# Patient Record
Sex: MALE | Race: WHITE | NOT HISPANIC OR LATINO | Employment: OTHER | URBAN - METROPOLITAN AREA
[De-identification: names, ages, dates, MRNs, and addresses within clinical notes are randomized per-mention and may not be internally consistent; named-entity substitution may affect disease eponyms.]

---

## 2018-03-16 PROBLEM — M75.101 TEAR OF RIGHT ROTATOR CUFF: Status: ACTIVE | Noted: 2018-03-16

## 2021-10-27 ENCOUNTER — OFFICE VISIT (OUTPATIENT)
Dept: PAIN MEDICINE | Facility: CLINIC | Age: 81
End: 2021-10-27
Payer: MEDICARE

## 2021-10-27 VITALS
HEART RATE: 60 BPM | BODY MASS INDEX: 24.71 KG/M2 | TEMPERATURE: 97 F | WEIGHT: 176.5 LBS | HEIGHT: 71 IN | SYSTOLIC BLOOD PRESSURE: 114 MMHG | DIASTOLIC BLOOD PRESSURE: 66 MMHG | OXYGEN SATURATION: 100 % | RESPIRATION RATE: 20 BRPM

## 2021-10-27 DIAGNOSIS — M47.816 LUMBAR SPONDYLOSIS: Primary | ICD-10-CM

## 2021-10-27 DIAGNOSIS — M43.16 SPONDYLOLISTHESIS OF LUMBAR REGION: ICD-10-CM

## 2021-10-27 DIAGNOSIS — M54.50 LOW BACK PAIN, UNSPECIFIED BACK PAIN LATERALITY, UNSPECIFIED CHRONICITY, UNSPECIFIED WHETHER SCIATICA PRESENT: ICD-10-CM

## 2021-10-27 DIAGNOSIS — M48.061 SPINAL STENOSIS OF LUMBAR REGION WITHOUT NEUROGENIC CLAUDICATION: ICD-10-CM

## 2021-10-27 DIAGNOSIS — M51.36 DDD (DEGENERATIVE DISC DISEASE), LUMBAR: ICD-10-CM

## 2021-10-27 PROCEDURE — 99214 OFFICE O/P EST MOD 30 MIN: CPT | Mod: PBBFAC,PN | Performed by: ANESTHESIOLOGY

## 2021-10-27 PROCEDURE — 99205 PR OFFICE/OUTPT VISIT, NEW, LEVL V, 60-74 MIN: ICD-10-PCS | Mod: S$PBB,,, | Performed by: ANESTHESIOLOGY

## 2021-10-27 PROCEDURE — 99205 OFFICE O/P NEW HI 60 MIN: CPT | Mod: S$PBB,,, | Performed by: ANESTHESIOLOGY

## 2021-10-27 PROCEDURE — 99999 PR PBB SHADOW E&M-EST. PATIENT-LVL IV: CPT | Mod: PBBFAC,,, | Performed by: ANESTHESIOLOGY

## 2021-10-27 PROCEDURE — 99999 PR PBB SHADOW E&M-EST. PATIENT-LVL IV: ICD-10-PCS | Mod: PBBFAC,,, | Performed by: ANESTHESIOLOGY

## 2021-11-09 ENCOUNTER — TELEPHONE (OUTPATIENT)
Dept: PAIN MEDICINE | Facility: CLINIC | Age: 81
End: 2021-11-09
Payer: MEDICARE

## 2021-11-22 ENCOUNTER — OFFICE VISIT (OUTPATIENT)
Dept: PAIN MEDICINE | Facility: CLINIC | Age: 81
End: 2021-11-22
Payer: MEDICARE

## 2021-11-22 VITALS
OXYGEN SATURATION: 98 % | DIASTOLIC BLOOD PRESSURE: 77 MMHG | HEART RATE: 60 BPM | RESPIRATION RATE: 20 BRPM | BODY MASS INDEX: 24.88 KG/M2 | HEIGHT: 71 IN | TEMPERATURE: 97 F | SYSTOLIC BLOOD PRESSURE: 140 MMHG | WEIGHT: 177.69 LBS

## 2021-11-22 DIAGNOSIS — M54.16 LUMBAR RADICULOPATHY: Primary | ICD-10-CM

## 2021-11-22 DIAGNOSIS — M43.16 SPONDYLOLISTHESIS OF LUMBAR REGION: ICD-10-CM

## 2021-11-22 DIAGNOSIS — M51.36 DDD (DEGENERATIVE DISC DISEASE), LUMBAR: ICD-10-CM

## 2021-11-22 DIAGNOSIS — M47.816 LUMBAR SPONDYLOSIS: ICD-10-CM

## 2021-11-22 DIAGNOSIS — M48.061 SPINAL STENOSIS OF LUMBAR REGION WITHOUT NEUROGENIC CLAUDICATION: ICD-10-CM

## 2021-11-22 PROCEDURE — 99999 PR PBB SHADOW E&M-EST. PATIENT-LVL V: CPT | Mod: PBBFAC,,, | Performed by: PHYSICIAN ASSISTANT

## 2021-11-22 PROCEDURE — 99214 OFFICE O/P EST MOD 30 MIN: CPT | Mod: S$PBB,,, | Performed by: PHYSICIAN ASSISTANT

## 2021-11-22 PROCEDURE — 99214 PR OFFICE/OUTPT VISIT, EST, LEVL IV, 30-39 MIN: ICD-10-PCS | Mod: S$PBB,,, | Performed by: PHYSICIAN ASSISTANT

## 2021-11-22 PROCEDURE — 99215 OFFICE O/P EST HI 40 MIN: CPT | Mod: PBBFAC,PN | Performed by: PHYSICIAN ASSISTANT

## 2021-11-22 PROCEDURE — 99999 PR PBB SHADOW E&M-EST. PATIENT-LVL V: ICD-10-PCS | Mod: PBBFAC,,, | Performed by: PHYSICIAN ASSISTANT

## 2021-11-22 RX ORDER — ALPRAZOLAM 0.5 MG/1
1 TABLET, ORALLY DISINTEGRATING ORAL ONCE AS NEEDED
Status: CANCELLED | OUTPATIENT
Start: 2021-12-08 | End: 2033-05-05

## 2021-12-08 ENCOUNTER — HOSPITAL ENCOUNTER (OUTPATIENT)
Facility: HOSPITAL | Age: 81
Discharge: HOME | End: 2021-12-08
Attending: ANESTHESIOLOGY | Admitting: ANESTHESIOLOGY
Payer: MEDICARE

## 2021-12-08 ENCOUNTER — HOSPITAL ENCOUNTER (OUTPATIENT)
Dept: RADIOLOGY | Facility: HOSPITAL | Age: 81
Discharge: HOME | End: 2021-12-08
Attending: ANESTHESIOLOGY
Payer: MEDICARE

## 2021-12-08 DIAGNOSIS — M54.16 LUMBAR RADICULOPATHY: ICD-10-CM

## 2021-12-08 DIAGNOSIS — M54.50 LOWER BACK PAIN: ICD-10-CM

## 2021-12-08 PROCEDURE — 64483 NJX AA&/STRD TFRM EPI L/S 1: CPT | Mod: 50,PO | Performed by: ANESTHESIOLOGY

## 2021-12-08 PROCEDURE — 64483 NJX AA&/STRD TFRM EPI L/S 1: CPT | Mod: 50,,, | Performed by: ANESTHESIOLOGY

## 2021-12-08 PROCEDURE — 25000003 PHARM REV CODE 250: Mod: PO | Performed by: ANESTHESIOLOGY

## 2021-12-08 PROCEDURE — 64483 PR EPIDURAL INJ, ANES/STEROID, TRANSFORAMINAL, LUMB/SACR, SNGL LEVL: ICD-10-PCS | Mod: 50,,, | Performed by: ANESTHESIOLOGY

## 2021-12-08 PROCEDURE — 76000 FLUOROSCOPY <1 HR PHYS/QHP: CPT | Mod: TC,PO

## 2021-12-08 PROCEDURE — 25500020 PHARM REV CODE 255: Mod: PO | Performed by: ANESTHESIOLOGY

## 2021-12-08 PROCEDURE — 63600175 PHARM REV CODE 636 W HCPCS: Mod: PO | Performed by: ANESTHESIOLOGY

## 2021-12-08 RX ORDER — LIDOCAINE HYDROCHLORIDE 10 MG/ML
INJECTION, SOLUTION EPIDURAL; INFILTRATION; INTRACAUDAL; PERINEURAL
Status: DISCONTINUED | OUTPATIENT
Start: 2021-12-08 | End: 2021-12-08 | Stop reason: HOSPADM

## 2021-12-08 RX ORDER — BUPIVACAINE HYDROCHLORIDE 2.5 MG/ML
INJECTION, SOLUTION EPIDURAL; INFILTRATION; INTRACAUDAL
Status: DISCONTINUED | OUTPATIENT
Start: 2021-12-08 | End: 2021-12-08 | Stop reason: HOSPADM

## 2021-12-08 RX ORDER — METHYLPREDNISOLONE ACETATE 80 MG/ML
INJECTION, SUSPENSION INTRA-ARTICULAR; INTRALESIONAL; INTRAMUSCULAR; SOFT TISSUE
Status: DISCONTINUED | OUTPATIENT
Start: 2021-12-08 | End: 2021-12-08 | Stop reason: HOSPADM

## 2021-12-08 RX ORDER — ALPRAZOLAM 0.5 MG/1
1 TABLET, ORALLY DISINTEGRATING ORAL ONCE AS NEEDED
Status: DISCONTINUED | OUTPATIENT
Start: 2021-12-08 | End: 2021-12-08 | Stop reason: HOSPADM

## 2021-12-09 ENCOUNTER — TELEPHONE (OUTPATIENT)
Dept: PAIN MEDICINE | Facility: CLINIC | Age: 81
End: 2021-12-09
Payer: MEDICARE

## 2021-12-09 VITALS
HEART RATE: 55 BPM | OXYGEN SATURATION: 97 % | RESPIRATION RATE: 16 BRPM | WEIGHT: 175 LBS | HEIGHT: 71 IN | SYSTOLIC BLOOD PRESSURE: 137 MMHG | BODY MASS INDEX: 24.5 KG/M2 | TEMPERATURE: 98 F | DIASTOLIC BLOOD PRESSURE: 77 MMHG

## 2022-01-05 ENCOUNTER — OFFICE VISIT (OUTPATIENT)
Dept: PAIN MEDICINE | Facility: CLINIC | Age: 82
End: 2022-01-05
Payer: MEDICARE

## 2022-01-05 VITALS
DIASTOLIC BLOOD PRESSURE: 67 MMHG | TEMPERATURE: 98 F | BODY MASS INDEX: 25.29 KG/M2 | SYSTOLIC BLOOD PRESSURE: 139 MMHG | OXYGEN SATURATION: 100 % | WEIGHT: 181.31 LBS | HEART RATE: 60 BPM | RESPIRATION RATE: 18 BRPM

## 2022-01-05 DIAGNOSIS — M54.16 LUMBAR RADICULOPATHY: Primary | ICD-10-CM

## 2022-01-05 DIAGNOSIS — M47.816 LUMBAR SPONDYLOSIS: ICD-10-CM

## 2022-01-05 DIAGNOSIS — M48.061 SPINAL STENOSIS OF LUMBAR REGION WITHOUT NEUROGENIC CLAUDICATION: ICD-10-CM

## 2022-01-05 DIAGNOSIS — M43.16 SPONDYLOLISTHESIS OF LUMBAR REGION: ICD-10-CM

## 2022-01-05 DIAGNOSIS — M51.36 DDD (DEGENERATIVE DISC DISEASE), LUMBAR: ICD-10-CM

## 2022-01-05 PROCEDURE — 99213 OFFICE O/P EST LOW 20 MIN: CPT | Mod: PBBFAC,PN | Performed by: PHYSICIAN ASSISTANT

## 2022-01-05 PROCEDURE — 99212 OFFICE O/P EST SF 10 MIN: CPT | Mod: S$PBB,,, | Performed by: PHYSICIAN ASSISTANT

## 2022-01-05 PROCEDURE — 99999 PR PBB SHADOW E&M-EST. PATIENT-LVL III: ICD-10-PCS | Mod: PBBFAC,,, | Performed by: PHYSICIAN ASSISTANT

## 2022-01-05 PROCEDURE — 99999 PR PBB SHADOW E&M-EST. PATIENT-LVL III: CPT | Mod: PBBFAC,,, | Performed by: PHYSICIAN ASSISTANT

## 2022-01-05 PROCEDURE — 99212 PR OFFICE/OUTPT VISIT, EST, LEVL II, 10-19 MIN: ICD-10-PCS | Mod: S$PBB,,, | Performed by: PHYSICIAN ASSISTANT

## 2022-01-05 NOTE — PROGRESS NOTES
This note was completed with dictation software and grammatical errors may exist.    CC:  Back pain    HPI:  The patient is an 81-year-old man with a history of glaucoma, cataracts presents in referral from Dr. Eh Love for back pain.  He is status post bilateral L3/4 transforaminal epidural steroid injections on 12/08/2021 with 85% relief.  He states that his pain is now intermittent and he no longer has leg pain.  He has some mild pain and stiffness across his low back in the mornings with occasional radiation to his buttocks but once he gets going for the day he states that he is fine.  He can play golf and feels that exercise at the gym and activity helps with his pain.  If he sits too long he will also have some pain.  He denies any changes to his bladder incontinence.  He denies bowel incontinence, weakness or numbness.    Prior HPI: The patient reports that he has been active for many years, was of runner even up until his 70s.  He states that 5 years ago he was running and stepped into a hole and felt sudden right hip pain, felt like his hip was shifted to the right.  He states that ever since this time, he has had back pain.  The back pain is located primarily across the bilateral low back and radiates out to the right lateral buttock and hip at times.  He used to have pain radiating along his hamstrings but no longer has this.  He denies any pain radiating into his lower legs, no numbness or weakness.  He states that he feels stiff when he wakes up in the morning but is able to exercise and do activities throughout the day without issue.  However, his pain can come randomly such as when bending over he can feel a sudden sharp pain.  If he has pain, he can do stretches that can often relieve this.  It seems like his pain is mostly worse with extension.  He denies any bowel or bladder incontinence, no weakness.  He does not take any pain medication on a regular basis.  He exercise on a regular basis now  doing stationary bike, weightlifting.    Pain intervention history:   He is status post bilateral L3/4 transforaminal epidural steroid injections on 12/08/2021 with 85% relief.      Spine surgeries:  He has seen Dr. Claude Owusu who suggested that he continue exercising, no surgery needed.    Antineuropathics: Gabapentin 100mg qhs  NSAIDs: Mobic 15  Physical therapy:  He had gone to [facility] physical therapy, also chiropractic care over the last several months.  Antidepressants:  Muscle relaxers:  Opioids:  Antiplatelets/Anticoagulants:    ROS:  He reports back pain, joint stiffness.  Balance of review of systems is negative.    No results found for: LABA1C, HGBA1C    Lab Results   Component Value Date    WBC 8.01 03/16/2018    HGB 15.0 03/16/2018    HCT 43.2 03/16/2018    MCV 93 03/16/2018     03/16/2018             Past Medical History:   Diagnosis Date    Cataract     Detached retina, bilateral     Glaucoma        Past Surgical History:   Procedure Laterality Date    CATARACT EXTRACTION Bilateral     NASAL SEPTUM SURGERY  07/05/2017    SHOULDER ARTHROSCOPY Right 03/16/2018    RCR    TRANSFORAMINAL EPIDURAL INJECTION OF STEROID Bilateral 12/8/2021    Procedure: Injection,steroid,epidural,transforaminal approach L3/4;  Surgeon: Pedro Elizalde MD;  Location: [facility] OR;  Service: Pain Management;  Laterality: Bilateral;    VARICOCELECTOMY         Social History     Socioeconomic History    Marital status:    Tobacco Use    Smoking status: Never Smoker    Smokeless tobacco: Never Used   Substance and Sexual Activity    Alcohol use: Yes     Alcohol/week: 4.0 standard drinks     Types: 4 Glasses of wine per week    Drug use: No         Medications/Allergies: See med card    Vitals:    01/05/22 0844   BP: 139/67   Pulse: 60   Resp: 18   Temp: 97.6 °F (36.4 °C)   TempSrc: Oral   SpO2: 100%   Weight: 82.2 kg (181 lb 5.3 oz)   PainSc:   2   PainLoc: Back         Physical exam:  Gen: A and O x3,  pleasant, well-groomed  Skin: No rashes or obvious lesions  HEENT: PERRLA, no obvious deformities on ears or in canals. Trachea midline.  CVS: Regular rate and rhythm, normal palpable pulses.  Resp:No increased work of breathing, symmetrical chest rise.  Abdomen: Soft, NT/ND.  Musculoskeletal: Able to heel walk, toe walk. No antalgic gait.     Neuro:  Lower extremities: 5/5 strength bilaterally  Reflexes: Patellar 1+, Achilles 0+ bilaterally.  Sensory:  Intact and symmetrical to light touch and pinprick in L2-S1 dermatomes bilaterally.    Lumbar spine:  Lumbar spine: ROM is full with flexion with no increased pain, mildly reduced with extension and oblique extension with increased pain on right oblique extension, no major pain with left oblique extension.  He does have some increased pain moving from fully flexed to neutral position.    Sammy's test causes no increased pain on either side.    Supine straight leg raise is negative bilaterally.    Internal and external rotation of the hip causes no increased pain on either side.  Myofascial exam: No tenderness to palpation across lumbar paraspinous muscles.    Imaging:  MRI lumbar spine 05/10/2019:  This is outside imaging, I reviewed this personally.  It appears that he has an S1/2 rudimentary disc.  At L5/S1 there is slight disc height loss and slight anterolisthesis, mild bilateral foraminal narrowing, no canal narrowing, there is facet arthropathy present.  At L4/5 there is a grade 1 anterolisthesis with uncovering of the disc and facet hypertrophy causing mild to moderate canal stenosis.  There is also severe disc height loss at this level.  At L3/4 there is disc height loss and facet hypertrophy, mild canal stenosis and bilateral foraminal stenosis.    Assessment:  The patient is an 81-year-old man with a history of glaucoma, cataracts presents in referral from Dr. Eh Love for back pain.      1. Lumbar radiculopathy     2. DDD (degenerative disc  disease), lumbar     3. Lumbar spondylosis     4. Spinal stenosis of lumbar region without neurogenic claudication     5. Spondylolisthesis of lumbar region         Plan:  1. The patient had almost complete relief following the bilateral L3/4 transforaminal epidural steroid injections.  This can be repeated in the future if necessary.  2. Follow-up as needed.

## 2022-02-14 ENCOUNTER — TELEPHONE (OUTPATIENT)
Dept: PAIN MEDICINE | Facility: CLINIC | Age: 82
End: 2022-02-14
Payer: MEDICARE

## 2022-02-14 NOTE — TELEPHONE ENCOUNTER
----- Message from Natasha Weller sent at 2/14/2022  8:55 AM CST -----  Type: Patient Call Back         Who called:patient          What is the request in detail:patient called in regarding a few question and concerns          Can the clinic reply by [facility]?no          Would the patient rather a call back or a response via [facility]?call back          Best call back number:[phone] (mobile)          Additional Information:           Thank You

## 2022-02-15 ENCOUNTER — OFFICE VISIT (OUTPATIENT)
Dept: PAIN MEDICINE | Facility: CLINIC | Age: 82
End: 2022-02-15
Payer: MEDICARE

## 2022-02-15 VITALS
WEIGHT: 189.25 LBS | HEIGHT: 71 IN | SYSTOLIC BLOOD PRESSURE: 156 MMHG | HEART RATE: 59 BPM | BODY MASS INDEX: 26.49 KG/M2 | DIASTOLIC BLOOD PRESSURE: 77 MMHG

## 2022-02-15 DIAGNOSIS — M54.16 LUMBAR RADICULOPATHY: Primary | ICD-10-CM

## 2022-02-15 DIAGNOSIS — Z11.9 SCREENING EXAMINATION FOR INFECTIOUS DISEASE: ICD-10-CM

## 2022-02-15 DIAGNOSIS — M51.36 DDD (DEGENERATIVE DISC DISEASE), LUMBAR: ICD-10-CM

## 2022-02-15 DIAGNOSIS — M43.16 SPONDYLOLISTHESIS OF LUMBAR REGION: ICD-10-CM

## 2022-02-15 DIAGNOSIS — M48.061 SPINAL STENOSIS OF LUMBAR REGION WITHOUT NEUROGENIC CLAUDICATION: ICD-10-CM

## 2022-02-15 PROCEDURE — 99999 PR PBB SHADOW E&M-EST. PATIENT-LVL III: CPT | Mod: PBBFAC,,, | Performed by: PHYSICIAN ASSISTANT

## 2022-02-15 PROCEDURE — 99214 OFFICE O/P EST MOD 30 MIN: CPT | Mod: S$PBB,CS,, | Performed by: PHYSICIAN ASSISTANT

## 2022-02-15 PROCEDURE — 99214 PR OFFICE/OUTPT VISIT, EST, LEVL IV, 30-39 MIN: ICD-10-PCS | Mod: S$PBB,CS,, | Performed by: PHYSICIAN ASSISTANT

## 2022-02-15 PROCEDURE — 99213 OFFICE O/P EST LOW 20 MIN: CPT | Mod: PBBFAC,PN | Performed by: PHYSICIAN ASSISTANT

## 2022-02-15 PROCEDURE — 99999 PR PBB SHADOW E&M-EST. PATIENT-LVL III: ICD-10-PCS | Mod: PBBFAC,,, | Performed by: PHYSICIAN ASSISTANT

## 2022-02-15 RX ORDER — TESTOSTERONE CYPIONATE 200 MG/ML
200 INJECTION, SOLUTION INTRAMUSCULAR
COMMUNITY
Start: 2022-02-07

## 2022-02-15 RX ORDER — TAMSULOSIN HYDROCHLORIDE 0.4 MG/1
1 CAPSULE ORAL EVERY MORNING
COMMUNITY
Start: 2022-02-01 | End: 2023-05-19

## 2022-02-15 RX ORDER — ESOMEPRAZOLE MAGNESIUM 40 MG/1
40 CAPSULE, DELAYED RELEASE ORAL
COMMUNITY
Start: 2022-02-08 | End: 2022-11-04

## 2022-02-17 NOTE — PROGRESS NOTES
This note was completed with dictation software and grammatical errors may exist.    CC:  Back pain    HPI:  The patient is an 81-year-old man with a history of glaucoma, cataracts presents in referral from Dr. Eh Love for back pain.  He returns in follow-up today with worsening low back pain.  He states he was doing well until he bent over to  a piece of paper off of the floor and his back pain return.  He also states he was doing shoulders shrugs at the gym 140 pounds which made it worse.  It is located across the right greater than left low back radiating to the lateral buttocks.  He states that pain is constant, worse with trying to stand straight.  He has severe pain when going from a seated to standing position and sometimes his right leg will give out if he has sudden pain.  However he denies any weakness or numbness.  He does report occasional bladder and bowel incontinence.    Prior HPI: The patient reports that he has been active for many years, was of runner even up until his 70s.  He states that 5 years ago he was running and stepped into a hole and felt sudden right hip pain, felt like his hip was shifted to the right.  He states that ever since this time, he has had back pain.  The back pain is located primarily across the bilateral low back and radiates out to the right lateral buttock and hip at times.  He used to have pain radiating along his hamstrings but no longer has this.  He denies any pain radiating into his lower legs, no numbness or weakness.  He states that he feels stiff when he wakes up in the morning but is able to exercise and do activities throughout the day without issue.  However, his pain can come randomly such as when bending over he can feel a sudden sharp pain.  If he has pain, he can do stretches that can often relieve this.  It seems like his pain is mostly worse with extension.  He denies any bowel or bladder incontinence, no weakness.  He does not take any pain  "medication on a regular basis.  He exercise on a regular basis now doing stationary bike, weightlifting.    Pain intervention history:   He is status post bilateral L3/4 transforaminal epidural steroid injections on 12/08/2021 with 85% relief.      Spine surgeries:  He has seen Dr. Claude Owusu who suggested that he continue exercising, no surgery needed.    Antineuropathics: Gabapentin 100mg qhs  NSAIDs: Mobic 15  Physical therapy:  [facility] gone [facility], also chiropractic care over the last several months.  Antidepressants:  Muscle relaxers:  Opioids:  Antiplatelets/Anticoagulants:    ROS:  He reports back pain, joint stiffness.  Balance of review of systems is negative.    No results found for: LABA1C, HGBA1C    Lab Results   Component Value Date    WBC 7.07 01/31/2022    HGB 12.7 (L) 01/31/2022    HCT 37.8 (L) 01/31/2022    MCV 94 01/31/2022     01/31/2022             Past Medical History:   Diagnosis Date    Cataract     Detached retina, bilateral     Glaucoma        Past Surgical History:   Procedure Laterality Date    CATARACT EXTRACTION Bilateral     NASAL SEPTUM SURGERY  07/05/2017    SHOULDER ARTHROSCOPY Right 03/16/2018    RCR    TRANSFORAMINAL EPIDURAL INJECTION OF STEROID Bilateral 12/8/2021    Procedure: Injection,steroid,epidural,transforaminal approach L3/4;  Surgeon: Pedro Elizalde MD;  Location: [facility];  Service: Pain Management;  Laterality: Bilateral;    VARICOCELECTOMY         Social History     Socioeconomic History    Marital status:    Tobacco Use    Smoking status: Never Smoker    Smokeless tobacco: Never Used   Substance and Sexual Activity    Alcohol use: Yes     Alcohol/week: 4.0 standard drinks     Types: 4 Glasses of wine per week    Drug use: No         Medications/Allergies: See med card    Vitals:    02/15/22 1005   BP: (!) 156/77   Pulse: (!) 59   Weight: 85.9 kg (189 lb 4.2 oz)   Height: 5' 11" (1.803 m)   PainSc:   9   PainLoc: Back "         Physical exam:  Gen: A and O x3, pleasant, well-groomed  Skin: No rashes or obvious lesions  HEENT: PERRLA, no obvious deformities on ears or in canals. Trachea midline.  CVS: Regular rate and rhythm, normal palpable pulses.  Resp:No increased work of breathing, symmetrical chest rise.  Abdomen: Soft, NT/ND.  Musculoskeletal:  Slow cautious gait, unable to stand fully upright without severe pain.    Neuro:  Lower extremities: 5/5 strength bilaterally  Reflexes: Patellar 1+, Achilles 0+ bilaterally.  Sensory:  Intact and symmetrical to light touch and pinprick in L2-S1 dermatomes bilaterally.    Lumbar spine:  Lumbar spine: ROM is full with flexion without increased pain.  Range of motion is severely limited with extension with severe increased pain.  Sammy's test causes no increased pain on either side.    Supine straight leg raise is negative bilaterally.    Internal and external rotation of the hip causes no increased pain on either side.  Myofascial exam: No tenderness to palpation across lumbar paraspinous muscles.    Imaging:  MRI lumbar spine 05/10/2019:  This is outside imaging, I reviewed this personally.  It appears that he has an S1/2 rudimentary disc.  At L5/S1 there is slight disc height loss and slight anterolisthesis, mild bilateral foraminal narrowing, no canal narrowing, there is facet arthropathy present.  At L4/5 there is a grade 1 anterolisthesis with uncovering of the disc and facet hypertrophy causing mild to moderate canal stenosis.  There is also severe disc height loss at this level.  At L3/4 there is disc height loss and facet hypertrophy, mild canal stenosis and bilateral foraminal stenosis.    Assessment:  The patient is an 81-year-old man with a history of glaucoma, cataracts presents in referral from Dr. Eh Love for back pain.      1. Lumbar radiculopathy     2. DDD (degenerative disc disease), lumbar     3. Spinal stenosis of lumbar region without neurogenic claudication      4. Spondylolisthesis of lumbar region     5. Screening examination for infectious disease  COVID-19 Routine Screening       Plan:  1. I am going to schedule patient to repeat bilateral L3/4 transforaminal epidural steroid injections.  He has done well with this in the past.  2. We discussed the importance of safe exercises.  3. Follow-up in 4 weeks postprocedure or sooner as needed.

## 2022-02-17 NOTE — H&P (VIEW-ONLY)
This note was completed with dictation software and grammatical errors may exist.    CC:  Back pain    HPI:  The patient is an 81-year-old man with a history of glaucoma, cataracts presents in referral from Dr. Eh Love for back pain.  He returns in follow-up today with worsening low back pain.  He states he was doing well until he bent over to  a piece of paper off of the floor and his back pain return.  He also states he was doing shoulders shrugs at the gym 140 pounds which made it worse.  It is located across the right greater than left low back radiating to the lateral buttocks.  He states that pain is constant, worse with trying to stand straight.  He has severe pain when going from a seated to standing position and sometimes his right leg will give out if he has sudden pain.  However he denies any weakness or numbness.  He does report occasional bladder and bowel incontinence.    Prior HPI: The patient reports that he has been active for many years, was of runner even up until his 70s.  He states that 5 years ago he was running and stepped into a hole and felt sudden right hip pain, felt like his hip was shifted to the right.  He states that ever since this time, he has had back pain.  The back pain is located primarily across the bilateral low back and radiates out to the right lateral buttock and hip at times.  He used to have pain radiating along his hamstrings but no longer has this.  He denies any pain radiating into his lower legs, no numbness or weakness.  He states that he feels stiff when he wakes up in the morning but is able to exercise and do activities throughout the day without issue.  However, his pain can come randomly such as when bending over he can feel a sudden sharp pain.  If he has pain, he can do stretches that can often relieve this.  It seems like his pain is mostly worse with extension.  He denies any bowel or bladder incontinence, no weakness.  He does not take any pain  "medication on a regular basis.  He exercise on a regular basis now doing stationary bike, weightlifting.    Pain intervention history:   He is status post bilateral L3/4 transforaminal epidural steroid injections on 12/08/2021 with 85% relief.      Spine surgeries:  He has seen Dr. Claude Owusu who suggested that he continue exercising, no surgery needed.    Antineuropathics: Gabapentin 100mg qhs  NSAIDs: Mobic 15  Physical therapy:  He had gone to [facility], also chiropractic care over the last several months.  Antidepressants:  Muscle relaxers:  Opioids:  Antiplatelets/Anticoagulants:    ROS:  He reports back pain, joint stiffness.  Balance of review of systems is negative.    No results found for: LABA1C, HGBA1C    Lab Results   Component Value Date    WBC 7.07 01/31/2022    HGB 12.7 (L) 01/31/2022    HCT 37.8 (L) 01/31/2022    MCV 94 01/31/2022     01/31/2022             Past Medical History:   Diagnosis Date    Cataract     Detached retina, bilateral     Glaucoma        Past Surgical History:   Procedure Laterality Date    CATARACT EXTRACTION Bilateral     NASAL SEPTUM SURGERY  07/05/2017    SHOULDER ARTHROSCOPY Right 03/16/2018    RCR    TRANSFORAMINAL EPIDURAL INJECTION OF STEROID Bilateral 12/8/2021    Procedure: Injection,steroid,epidural,transforaminal approach L3/4;  Surgeon: Pedro Elizalde MD;  Location: [facility];  Service: Pain Management;  Laterality: Bilateral;    VARICOCELECTOMY         Social History     Socioeconomic History    Marital status:    Tobacco Use    Smoking status: Never Smoker    Smokeless tobacco: Never Used   Substance and Sexual Activity    Alcohol use: Yes     Alcohol/week: 4.0 standard drinks     Types: 4 Glasses of wine per week    Drug use: No         Medications/Allergies: See med card    Vitals:    02/15/22 1005   BP: (!) 156/77   Pulse: (!) 59   Weight: 85.9 kg (189 lb 4.2 oz)   Height: 5' 11" (1.803 m)   PainSc:   9   PainLoc: Back "         Physical exam:  Gen: A and O x3, pleasant, well-groomed  Skin: No rashes or obvious lesions  HEENT: PERRLA, no obvious deformities on ears or in canals. Trachea midline.  CVS: Regular rate and rhythm, normal palpable pulses.  Resp:No increased work of breathing, symmetrical chest rise.  Abdomen: Soft, NT/ND.  Musculoskeletal:  Slow cautious gait, unable to stand fully upright without severe pain.    Neuro:  Lower extremities: 5/5 strength bilaterally  Reflexes: Patellar 1+, Achilles 0+ bilaterally.  Sensory:  Intact and symmetrical to light touch and pinprick in L2-S1 dermatomes bilaterally.    Lumbar spine:  Lumbar spine: ROM is full with flexion without increased pain.  Range of motion is severely limited with extension with severe increased pain.  Sammy's test causes no increased pain on either side.    Supine straight leg raise is negative bilaterally.    Internal and external rotation of the hip causes no increased pain on either side.  Myofascial exam: No tenderness to palpation across lumbar paraspinous muscles.    Imaging:  MRI lumbar spine 05/10/2019:  This is outside imaging, I reviewed this personally.  It appears that he has an S1/2 rudimentary disc.  At L5/S1 there is slight disc height loss and slight anterolisthesis, mild bilateral foraminal narrowing, no canal narrowing, there is facet arthropathy present.  At L4/5 there is a grade 1 anterolisthesis with uncovering of the disc and facet hypertrophy causing mild to moderate canal stenosis.  There is also severe disc height loss at this level.  At L3/4 there is disc height loss and facet hypertrophy, mild canal stenosis and bilateral foraminal stenosis.    Assessment:  The patient is an 81-year-old man with a history of glaucoma, cataracts presents in referral from Dr. Eh Love for back pain.      1. Lumbar radiculopathy     2. DDD (degenerative disc disease), lumbar     3. Spinal stenosis of lumbar region without neurogenic claudication      4. Spondylolisthesis of lumbar region     5. Screening examination for infectious disease  COVID-19 Routine Screening       Plan:  1. I am going to schedule patient to repeat bilateral L3/4 transforaminal epidural steroid injections.  He has done well with this in the past.  2. We discussed the importance of safe exercises.  3. Follow-up in 4 weeks postprocedure or sooner as needed.

## 2022-02-19 ENCOUNTER — LAB VISIT (OUTPATIENT)
Dept: FAMILY MEDICINE | Facility: CLINIC | Age: 82
End: 2022-02-19
Payer: MEDICARE

## 2022-02-19 DIAGNOSIS — Z11.9 SCREENING EXAMINATION FOR INFECTIOUS DISEASE: ICD-10-CM

## 2022-02-19 PROCEDURE — U0003 INFECTIOUS AGENT DETECTION BY NUCLEIC ACID (DNA OR RNA); SEVERE ACUTE RESPIRATORY SYNDROME CORONAVIRUS 2 (SARS-COV-2) (CORONAVIRUS DISEASE [COVID-19]), AMPLIFIED PROBE TECHNIQUE, MAKING USE OF HIGH THROUGHPUT TECHNOLOGIES AS DESCRIBED BY CMS-2020-01-R: HCPCS | Performed by: PHYSICIAN ASSISTANT

## 2022-02-21 LAB
SARS-COV-2 RNA RESP QL NAA+PROBE: NOT DETECTED
SARS-COV-2- CYCLE NUMBER: NORMAL

## 2022-02-22 DIAGNOSIS — M54.16 LUMBAR RADICULOPATHY: Primary | ICD-10-CM

## 2022-02-22 RX ORDER — ALPRAZOLAM 0.5 MG/1
1 TABLET, ORALLY DISINTEGRATING ORAL ONCE AS NEEDED
Status: CANCELLED | OUTPATIENT
Start: 2022-02-22 | End: 2033-07-21

## 2022-02-24 ENCOUNTER — TELEPHONE (OUTPATIENT)
Dept: PAIN MEDICINE | Facility: CLINIC | Age: 82
End: 2022-02-24
Payer: MEDICARE

## 2022-02-24 NOTE — TELEPHONE ENCOUNTER
----- Message from Marika Juarez sent at 2/24/2022  9:36 AM CST -----  Regarding: Nurse  Contact: Pt  Type: Requesting to speak with nurse    Who Called: PT  Regarding: Injection  Would the patient rather a call back or a response via [facility]? Call back  Best Call Back Number: [phone]  Additional Information:  Pleas call Pt

## 2022-03-02 ENCOUNTER — TELEPHONE (OUTPATIENT)
Dept: PAIN MEDICINE | Facility: CLINIC | Age: 82
End: 2022-03-02
Payer: MEDICARE

## 2022-03-02 DIAGNOSIS — Z11.9 SCREENING EXAMINATION FOR INFECTIOUS DISEASE: ICD-10-CM

## 2022-03-02 NOTE — TELEPHONE ENCOUNTER
----- Message from Maryse Veloz LPN sent at 3/2/2022 10:31 AM CST -----    ----- Message -----  From: Connie Douglas MA  Sent: 2/25/2022   9:23 AM CST  To: Tonio DELGADO Staff    Type:  Patient Returning Call    Who Called:  Juan  Who Left Message for Patient:  Beatrice, but can speak with anyone  Does the patient know what this is regarding?:  scheduling for Epidural  Best Call Back Number:  [phone]  Additional Information:

## 2022-03-03 NOTE — TELEPHONE ENCOUNTER
----- Message from Tremayne Douglas MA sent at 3/3/2022  8:46 AM CST -----  Contact: CLAUDY ARGUETA [[id number]]  Type: Needs Medical Advice    Who Called: CLAUDY ARGUETA [[id number]]  Best Call Back Number: [phone]  Inquiry/Question: Would you kindly call CLAUDY ARGUETA [[id number]] regarding a missed call from the clinic  Thank you~

## 2022-03-15 ENCOUNTER — HOSPITAL ENCOUNTER (OUTPATIENT)
Facility: HOSPITAL | Age: 82
Discharge: HOME | End: 2022-03-15
Attending: ANESTHESIOLOGY | Admitting: ANESTHESIOLOGY
Payer: MEDICARE

## 2022-03-15 ENCOUNTER — HOSPITAL ENCOUNTER (OUTPATIENT)
Dept: RADIOLOGY | Facility: HOSPITAL | Age: 82
Discharge: HOME | End: 2022-03-15
Attending: ANESTHESIOLOGY
Payer: MEDICARE

## 2022-03-15 DIAGNOSIS — M54.16 LUMBAR RADICULOPATHY: ICD-10-CM

## 2022-03-15 DIAGNOSIS — M54.50 LOWER BACK PAIN: ICD-10-CM

## 2022-03-15 PROCEDURE — 63600175 PHARM REV CODE 636 W HCPCS: Mod: PO | Performed by: ANESTHESIOLOGY

## 2022-03-15 PROCEDURE — 25000003 PHARM REV CODE 250: Mod: PO | Performed by: ANESTHESIOLOGY

## 2022-03-15 PROCEDURE — 76000 FLUOROSCOPY <1 HR PHYS/QHP: CPT | Mod: TC,PO

## 2022-03-15 PROCEDURE — 25500020 PHARM REV CODE 255: Mod: PO | Performed by: ANESTHESIOLOGY

## 2022-03-15 PROCEDURE — 64483 NJX AA&/STRD TFRM EPI L/S 1: CPT | Mod: 50,PO | Performed by: ANESTHESIOLOGY

## 2022-03-15 PROCEDURE — 64483 NJX AA&/STRD TFRM EPI L/S 1: CPT | Mod: 50,,, | Performed by: ANESTHESIOLOGY

## 2022-03-15 PROCEDURE — 64483 PR EPIDURAL INJ, ANES/STEROID, TRANSFORAMINAL, LUMB/SACR, SNGL LEVL: ICD-10-PCS | Mod: 50,,, | Performed by: ANESTHESIOLOGY

## 2022-03-15 RX ORDER — LIDOCAINE HYDROCHLORIDE 10 MG/ML
INJECTION, SOLUTION EPIDURAL; INFILTRATION; INTRACAUDAL; PERINEURAL
Status: DISCONTINUED | OUTPATIENT
Start: 2022-03-15 | End: 2022-03-15 | Stop reason: HOSPADM

## 2022-03-15 RX ORDER — ALPRAZOLAM 0.5 MG/1
1 TABLET, ORALLY DISINTEGRATING ORAL ONCE AS NEEDED
Status: COMPLETED | OUTPATIENT
Start: 2022-03-15 | End: 2022-03-15

## 2022-03-15 RX ORDER — BUPIVACAINE HYDROCHLORIDE 2.5 MG/ML
INJECTION, SOLUTION EPIDURAL; INFILTRATION; INTRACAUDAL
Status: DISCONTINUED | OUTPATIENT
Start: 2022-03-15 | End: 2022-03-15 | Stop reason: HOSPADM

## 2022-03-15 RX ORDER — METHYLPREDNISOLONE ACETATE 80 MG/ML
INJECTION, SUSPENSION INTRA-ARTICULAR; INTRALESIONAL; INTRAMUSCULAR; SOFT TISSUE
Status: DISCONTINUED | OUTPATIENT
Start: 2022-03-15 | End: 2022-03-15 | Stop reason: HOSPADM

## 2022-03-15 RX ADMIN — ALPRAZOLAM 0.5 MG: 0.5 TABLET, ORALLY DISINTEGRATING ORAL at 03:03

## 2022-03-15 NOTE — DISCHARGE SUMMARY
[facility] - Surgery  Discharge Note  Short Stay    Procedure(s) (LRB):  Injection,steroid,epidural,transforaminal approach L3/4 (Bilateral)    OUTCOME: Patient tolerated treatment/procedure well without complication and is now ready for discharge.    DISPOSITION: Home or Self Care    FINAL DIAGNOSIS:  Lumbar radiculopathy    FOLLOWUP: In clinic    DISCHARGE INSTRUCTIONS:    Discharge Procedure Orders   Diet Adult Regular     No dressing needed     Notify your health care provider if you experience any of the following:  temperature >100.4     Activity as tolerated

## 2022-03-15 NOTE — OP NOTE
PROCEDURE DATE: 3/15/2022    PROCEDURE: Bilateral L3/4 transforaminal epidural steroid injection under fluoroscopy    DIAGNOSIS: Lumbar  Radiculopathy    Post op diagnosis: Same    PHYSICIAN: Pedro Elizalde MD    MEDICATIONS INJECTED:  Methylprednisolone 40mg (1ml) and 1ml 0.25% bupivicaine at each nerve root.     LOCAL ANESTHETIC INJECTED:  Lidocaine 1%. 4 ml per site.    SEDATION MEDICATIONS: none    ESTIMATED BLOOD LOSS:  none    COMPLICATIONS:  none    TECHNIQUE:   A time-out was taken to identify patient and procedure side prior to starting the procedure. The patient was placed in a prone position, prepped and draped in the usual sterile fashion using ChloraPrep and sterile towels.  The area to be injected was determined under fluoroscopic guidance in AP and oblique view.  Local anesthetic was given by raising a wheal and going down to the hub of a 25-gauge 1.5 inch needle.  In oblique view, a 3.5 inch 22-gauge bent-tip spinal needle was introduced towards 6 oclock position of the pedicle of each above named nerve root level.  The needle was walked medially then hinged into the neural foramen and position was confirmed in AP and lateral views.  Omnipaque contrast dye was injected to confirm appropriate placement and that there was no vascular uptake.  After negative aspiration for blood or CSF, the medication was then injected. This was performed at the right and then left L3/4 level(s). The patient tolerated the procedure well.    The patient was monitored after the procedure.  Patient was given post procedure and discharge instructions to follow at home. The patient was discharged in a stable condition.

## 2022-03-16 VITALS
HEART RATE: 60 BPM | HEIGHT: 72 IN | BODY MASS INDEX: 24.11 KG/M2 | OXYGEN SATURATION: 98 % | WEIGHT: 178 LBS | SYSTOLIC BLOOD PRESSURE: 140 MMHG | DIASTOLIC BLOOD PRESSURE: 78 MMHG | RESPIRATION RATE: 16 BRPM | TEMPERATURE: 98 F

## 2022-03-21 PROBLEM — R20.0 LOSS OF FEELING OR SENSATION: Status: ACTIVE | Noted: 2022-03-21

## 2022-03-21 PROBLEM — G56.03 CARPAL TUNNEL SYNDROME, BILATERAL: Status: ACTIVE | Noted: 2022-03-21

## 2022-03-28 ENCOUNTER — TELEPHONE (OUTPATIENT)
Dept: PAIN MEDICINE | Facility: CLINIC | Age: 82
End: 2022-03-28
Payer: MEDICARE

## 2022-03-28 NOTE — TELEPHONE ENCOUNTER
----- Message from Mona Shepard sent at 3/28/2022 10:22 AM CDT -----  Regarding: Call back  Contact: [phone]  Type:  Patient Call Back    Who Called:pt  What this is regarding?:speak to PA about Dr. Elizalde and does he do a specific procedure  Would the patient rather a call back or a response via [facility]? Call back  Best Call Back Number:[phone]  Additional Information:     Advised to call back directly if there are further questions, or if these symptoms fail to improve as anticipated or worsen.

## 2022-04-12 ENCOUNTER — OFFICE VISIT (OUTPATIENT)
Dept: PAIN MEDICINE | Facility: CLINIC | Age: 82
End: 2022-04-12
Payer: MEDICARE

## 2022-04-12 VITALS
SYSTOLIC BLOOD PRESSURE: 155 MMHG | RESPIRATION RATE: 16 BRPM | DIASTOLIC BLOOD PRESSURE: 82 MMHG | HEART RATE: 55 BPM | HEIGHT: 72 IN | WEIGHT: 188.81 LBS | BODY MASS INDEX: 25.57 KG/M2

## 2022-04-12 DIAGNOSIS — M43.16 SPONDYLOLISTHESIS OF LUMBAR REGION: ICD-10-CM

## 2022-04-12 DIAGNOSIS — M54.16 LUMBAR RADICULOPATHY: ICD-10-CM

## 2022-04-12 DIAGNOSIS — M48.061 SPINAL STENOSIS OF LUMBAR REGION WITHOUT NEUROGENIC CLAUDICATION: ICD-10-CM

## 2022-04-12 DIAGNOSIS — M51.36 DDD (DEGENERATIVE DISC DISEASE), LUMBAR: ICD-10-CM

## 2022-04-12 DIAGNOSIS — M48.02 CERVICAL SPINAL STENOSIS: Primary | ICD-10-CM

## 2022-04-12 PROCEDURE — 99999 PR PBB SHADOW E&M-EST. PATIENT-LVL III: CPT | Mod: PBBFAC,,, | Performed by: PHYSICIAN ASSISTANT

## 2022-04-12 PROCEDURE — 99999 PR PBB SHADOW E&M-EST. PATIENT-LVL III: ICD-10-PCS | Mod: PBBFAC,,, | Performed by: PHYSICIAN ASSISTANT

## 2022-04-12 PROCEDURE — 99213 PR OFFICE/OUTPT VISIT, EST, LEVL III, 20-29 MIN: ICD-10-PCS | Mod: S$PBB,,, | Performed by: PHYSICIAN ASSISTANT

## 2022-04-12 PROCEDURE — 99213 OFFICE O/P EST LOW 20 MIN: CPT | Mod: S$PBB,,, | Performed by: PHYSICIAN ASSISTANT

## 2022-04-12 PROCEDURE — 99213 OFFICE O/P EST LOW 20 MIN: CPT | Mod: PBBFAC,PN | Performed by: PHYSICIAN ASSISTANT

## 2022-04-18 NOTE — PROGRESS NOTES
This note was completed with dictation software and grammatical errors may exist.    CC:  Back pain    HPI:  The patient is an 81-year-old man with a history of glaucoma, cataracts presents in referral from Dr. Eh Love for back pain.  He is status post bilateral L3/4 transforaminal epidural steroid injections on 03/15/2022 with at least 50% relief.  He continues to have some low back pain that is worse in the mornings but intermittent.  He has pain in his buttocks and beneath his buttocks every now and then.  He reports a numb and cold sensation in his forearms and hands bilaterally.  He denies any major neck pain.  He does have a history of left ulnar nerve release and left carpal tunnel surgery without relief.    Prior HPI: The patient reports that he has been active for many years, was of runner even up until his 70s.  He states that 5 years ago he was running and stepped into a hole and felt sudden right hip pain, felt like his hip was shifted to the right.  He states that ever since this time, he has had back pain.  The back pain is located primarily across the bilateral low back and radiates out to the right lateral buttock and hip at times.  He used to have pain radiating along his hamstrings but no longer has this.  He denies any pain radiating into his lower legs, no numbness or weakness.  He states that he feels stiff when he wakes up in the morning but is able to exercise and do activities throughout the day without issue.  However, his pain can come randomly such as when bending over he can feel a sudden sharp pain.  If he has pain, he can do stretches that can often relieve this.  It seems like his pain is mostly worse with extension.  He denies any bowel or bladder incontinence, no weakness.  He does not take any pain medication on a regular basis.  He exercise on a regular basis now doing stationary bike, weightlifting.    Pain intervention history:   He is status post bilateral L3/4  transforaminal epidural steroid injections on 12/08/2021 with 85% relief.    He is status post bilateral L3/4 transforaminal epidural steroid injections on 03/15/2022 with at least 50% relief.    Spine surgeries:  He has seen Dr. Claude Owusu who suggested that he continue exercising, no surgery needed.    Antineuropathics: Gabapentin 100mg qhs  NSAIDs: Mobic 15  Physical therapy:  [facility], also chiropractic care over the last several months.  Antidepressants:  Muscle relaxers:  Opioids:  Antiplatelets/Anticoagulants:    ROS:  He reports back pain, joint stiffness.  Balance of review of systems is negative.    No results found for: LABA1C, HGBA1C    Lab Results   Component Value Date    WBC 8.97 03/25/2022    HGB 14.7 03/25/2022    HCT 44.2 03/25/2022    MCV 95 03/25/2022     03/25/2022             Past Medical History:   Diagnosis Date    Cataract     Detached retina, bilateral     Glaucoma     Restless leg syndrome        Past Surgical History:   Procedure Laterality Date    CATARACT EXTRACTION Bilateral     NASAL SEPTUM SURGERY  07/05/2017    SHOULDER ARTHROSCOPY Right 03/16/2018    RCR    TRANSFORAMINAL EPIDURAL INJECTION OF STEROID Bilateral 12/8/2021    Procedure: Injection,steroid,epidural,transforaminal approach L3/4;  Surgeon: Pedro Elizalde MD;  Location: [facility] OR;  Service: Pain Management;  Laterality: Bilateral;    TRANSFORAMINAL EPIDURAL INJECTION OF STEROID Bilateral 3/15/2022    Procedure: Injection,steroid,epidural,transforaminal approach L3/4;  Surgeon: Pedro Elizalde MD;  Location: [facility] OR;  Service: Pain Management;  Laterality: Bilateral;    VARICOCELECTOMY         Social History     Socioeconomic History    Marital status:    Tobacco Use    Smoking status: Never Smoker    Smokeless tobacco: Never Used   Substance and Sexual Activity    Alcohol use: Yes     Alcohol/week: 4.0 standard drinks     Types: 4 Glasses of wine per week     Drug use: No         Medications/Allergies: See med card    Vitals:    04/12/22 1131   BP: (!) 155/82   Pulse: (!) 55   Resp: 16   Weight: 85.6 kg (188 lb 13.2 oz)   Height: 6' (1.829 m)   PainSc:   3   PainLoc: Back         Physical exam:  Gen: A and O x3, pleasant, well-groomed  Skin: No rashes or obvious lesions  HEENT: PERRLA, no obvious deformities on ears or in canals.Trachea midline.  CVS: Regular rate and rhythm, normal palpable pulses.  Resp: Clear to auscultation bilaterally, no wheezes or rales.  Abdomen: Soft, NT/ND.  Musculoskeletal: Able to heel walk, toe walk. No antalgic gait.   Coordination   Romberg: negative  Finger to nose coordination: normal  Heel to shin coordination: somewhat off balance but able to perform  Tandem walking coordination: normal    Neuro:  Motor:    Right Left   C4 Shoulder Abduction  5  5   C5 Elbow Flexion    5  5   C6 Wrist Extension  5  5   C7 Elbow Extension   5  5   C8/T1 Hand Intrinsics   5  5   C8 First Dorsal Interosseus  5  5   C8 Abductor Pollicus Brevis  5  5       Iliopsoas Quadriceps Knee  Flexion Tibialis  anterior Gastro- cnemius EHL   Lower: R 5/5 5/5 5/5 5/5 5/5 5/5    L 5/5 5/5 5/5 5/5 5/5 5/5        Left  Right    Triceps DTR 2+ 1+   Biceps DTR 2+ 1+   Brachioradialis DTR 2+ 1+   Patellar DTR 1+ 1+   Achilles DTR 0+ 0+   Hays Absent  Absent   Clonus Absent Absent   Babinski Absent Absent     Sensory: Intact and symmetrical to light touch and pinprick in C2-T1 dermatomes bilaterally. Intact and symmetrical to light touch and pinprick in L1-S1 dermatomes bilaterally.    Cervical spine: ROM is full in flexion, extension and lateral rotation without increased pain.  Spurling's maneuver causes no neck pain to either side.  Myofascial exam: No Tenderness to palpation across cervical paraspinous region bilaterally.    Lumbar spine:  Lumbar spine: ROM is full with flexion extension and oblique extension with no increased pain.    Sammy's test causes no  increased pain on either side.    Supine straight leg raise is negative bilaterally.    Internal and external rotation of the hip causes no increased pain on either side.  Myofascial exam: No tenderness to palpation across lumbar paraspinous muscles.      Imaging:  MRI lumbar spine 05/10/2019:  This is outside imaging, I reviewed this personally.  It appears that he has an S1/2 rudimentary disc.  At L5/S1 there is slight disc height loss and slight anterolisthesis, mild bilateral foraminal narrowing, no canal narrowing, there is facet arthropathy present.  At L4/5 there is a grade 1 anterolisthesis with uncovering of the disc and facet hypertrophy causing mild to moderate canal stenosis.  There is also severe disc height loss at this level.  At L3/4 there is disc height loss and facet hypertrophy, mild canal stenosis and bilateral foraminal stenosis.    03/24/2022 MRI cervical spine paradigm report  There is reversal the normal cervical lordosis with curvature to the left.  There is disc desiccation throughout the spine with disc space narrowing and anterior osteophytic spurring from C2-3 through C7-T1.  There are endplate Modic changes from C2-3 through C6-7.  Partial interbody fusion suggested at C4-5.  C2-3 central canal 8.3 millimeters, facet hypertrophic changes, moderate to severe right and severe left foraminal stenosis.  C3-4 there is a 3.2 millimeter disc protrusion with uncovertebral spurring with she contacts and displaces the cord, central canal 3.9 millimeters, facet hypertrophic changes, severe right and moderate to severe left foraminal stenosis.  C4-5 1 millimeter of uncovertebral spurring which contacts the cord, central canal measures 7.4 millimeters, facet hypertrophic changes, moderate to severe right and moderate left foraminal stenosis.  C5-6 there is a 3.1 millimeter disc protrusion with uncovertebral spurring which effaces the cord, central canal 7.3 millimeters, facet hypertrophic changes,  severe bilateral foraminal stenosis.  C6-7 there is a 3.4 millimeter disc protrusion with uncovertebral spurring which contacts the cord, central canal 7.1 millimeters, facet hypertrophic changes, severe bilateral foraminal stenosis.  C7-T1 there is a 1.1 millimeter disc bulge which effaces the dural sac, facet hypertrophic changes, severe bilateral foraminal stenosis.    Assessment:  The patient is an 81-year-old man with a history of glaucoma, cataracts presents in referral from Dr. Eh Love for back pain.      1. Cervical spinal stenosis  Ambulatory referral/consult to Neurosurgery   2. DDD (degenerative disc disease), lumbar     3. Lumbar radiculopathy     4. Spinal stenosis of lumbar region without neurogenic claudication     5. Spondylolisthesis of lumbar region         Plan:  1. The patient did well following bilat her health 4 transforaminal epidural steroid injections.  This can be repeated in the future if necessary.  2. He brought his cervical spine MRI report which revealed severe stenosis at C3-4 and multilevel foraminal stenosis.  He is not having neck pain but reports constant numbness and cold in his forearms and hands.  Prior to considering an epidural steroid injection I am going to have him see Neurosurgery for further evaluation.  I advised him to obtain a disc so that he can bring this to his Neurosurgery appointment.

## 2022-04-19 PROBLEM — R20.0 LOSS OF FEELING OR SENSATION: Status: RESOLVED | Noted: 2022-03-21 | Resolved: 2022-04-19

## 2022-04-19 PROBLEM — G56.03 CARPAL TUNNEL SYNDROME, BILATERAL: Status: RESOLVED | Noted: 2022-03-21 | Resolved: 2022-04-19

## 2022-04-20 ENCOUNTER — OFFICE VISIT (OUTPATIENT)
Dept: NEUROSURGERY | Facility: CLINIC | Age: 82
End: 2022-04-20
Payer: MEDICARE

## 2022-04-20 VITALS
WEIGHT: 188.69 LBS | BODY MASS INDEX: 25.56 KG/M2 | SYSTOLIC BLOOD PRESSURE: 138 MMHG | RESPIRATION RATE: 18 BRPM | HEIGHT: 72 IN | HEART RATE: 62 BPM | DIASTOLIC BLOOD PRESSURE: 85 MMHG

## 2022-04-20 DIAGNOSIS — M48.02 STENOSIS OF CERVICAL SPINE WITH MYELOPATHY: Primary | ICD-10-CM

## 2022-04-20 DIAGNOSIS — M54.2 CERVICALGIA: ICD-10-CM

## 2022-04-20 DIAGNOSIS — M48.02 CERVICAL SPINAL STENOSIS: ICD-10-CM

## 2022-04-20 DIAGNOSIS — G99.2 STENOSIS OF CERVICAL SPINE WITH MYELOPATHY: Primary | ICD-10-CM

## 2022-04-20 PROCEDURE — 99205 OFFICE O/P NEW HI 60 MIN: CPT | Mod: S$PBB,,, | Performed by: STUDENT IN AN ORGANIZED HEALTH CARE EDUCATION/TRAINING PROGRAM

## 2022-04-20 PROCEDURE — 99205 PR OFFICE/OUTPT VISIT, NEW, LEVL V, 60-74 MIN: ICD-10-PCS | Mod: S$PBB,,, | Performed by: STUDENT IN AN ORGANIZED HEALTH CARE EDUCATION/TRAINING PROGRAM

## 2022-04-20 NOTE — PROGRESS NOTES
[facility]  Clinic Consult     Consult Requested By: Eh Love MD, Chris Salmon *        SUBJECTIVE:     Chief Complaint:   Chief Complaint   Patient presents with    Cervical Spine Pain (C-spine)     Cervical spine stenosis. Severe numbness in both hands with occasional tingling. Denies pain in neck.       History of Present Illness:  Juan Terrazas is a 81 y.o. male who presents with evaluation of cervical stenosis with myelopathy  He presents with upper extremity numbness over the past year.  He has intermittent pain from the neck down the right arm to the fingers.  He has noted a progression of poor dexterity in both hands difficulty with fine motor tasks struggles with buttoning buttons.  Feels like this got worse  However stable over the last 6 months  High he feels that when he drives his arms get weaker and hands      He has concerns of balance falls progression of his symptoms was referred a cervical MRI with severe stenosis    He is very functional still works out at the gym, lifts weights  Enjoys playing golf          Review of patient's allergies indicates:   Allergen Reactions    Tetanus antitoxin      This was as a child, uses the newer tetanus vaccine.       Past Medical History:   Diagnosis Date    Cataract     Detached retina, bilateral     Glaucoma     Restless leg syndrome      Past Surgical History:   Procedure Laterality Date    CATARACT EXTRACTION Bilateral     NASAL SEPTUM SURGERY  07/05/2017    SHOULDER ARTHROSCOPY Right 03/16/2018    RCR    TRANSFORAMINAL EPIDURAL INJECTION OF STEROID Bilateral 12/8/2021    Procedure: Injection,steroid,epidural,transforaminal approach L3/4;  Surgeon: Pedro Elizalde MD;  Location: [facility] OR;  Service: Pain Management;  Laterality: Bilateral;    TRANSFORAMINAL EPIDURAL INJECTION OF STEROID Bilateral 3/15/2022    Procedure: Injection,steroid,epidural,transforaminal approach L3/4;  Surgeon:  Pedro Elizalde MD;  Location: [facility] OR;  Service: Pain Management;  Laterality: Bilateral;    VARICOCELECTOMY       Family History   Problem Relation Age of Onset    Heart disease Father      Social History     Tobacco Use    Smoking status: Never Smoker    Smokeless tobacco: Never Used   Substance Use Topics    Alcohol use: Yes     Alcohol/week: 4.0 standard drinks     Types: 4 Glasses of wine per week    Drug use: No            OBJECTIVE:     Vital Signs (Most Recent):  Pulse: 62 (04/20/22 1338)  Resp: 18 (04/20/22 1338)  BP: 138/85 (04/20/22 1338)    Physical Exam:      General: well developed, well nourished, no distress. .  Mental Status: Awake, Alert, Oriented x 4  Language: No aphasia  Speech: No dysarthria  Head: normocephalic, atraumatic.  Neck: trachea midline, no JVD   Cardiovascular: no LE edema  Pulmonary: normal respirations, no signs of respiratory distress  Abdomen: soft, non-distended  Skin: Skin is warm, dry and intact.    Motor Strength:  No abnormal movements seen.       Difficulty raising over heads  Strength  Deltoids Triceps Biceps Wrist Extension Wrist Flexion Hand  Interossei     Upper: R 4-/5 5/5 5/5 5/5 5/5 5/5 5/5      L 4-/5 5/5 5/5 5/5 5/5 5/5 5/5       Iliopsoas Quadriceps Knee  Flexion Tibialis  anterior Gastro- cnemius EHL  Foot Eversion Foot inversion   Lower: R 5/5 5/5 5/5 5/5 5/5 5/5 5/5 5/5 5/5    L 5/5 5/5 5/5 5/5 5/5 5/5 5/5 5/5 5/5   Dec lt/pp below elbows      DTR's: 2 + and  in UE and LE bicep 3+  Hays: absent +right  Clonus: absent  Babinski: absent    Gait: independent           Diagnostic Results:  I have independently reviewed the following imaging:  Mri    Multilevel degeneration  Severe stenosis C3-4 circumferential with ligament buckling  C4-5 severe DDD appears autofusion no cord compression    C5-6 and C6-7 DDD disc osteophyte with loss of functional reserve , cord compression          ASSESSMENT/PLAN:     Stenosis of cervical spine with  myelopathy    Cervical spinal stenosis  -     Ambulatory referral/consult to Neurosurgery  -     X-Ray Cervical Spine AP Lat with Flexion  Extension; Future; Expected date: 04/20/2022    Cervicalgia  -     CT Cervical Spine Without Contrast; Future; Expected date: 04/20/2022      Hx of back pain , managed conservatively  1 yr of s/s of cervical myelopathy with loss of sensation in UE, right radiating pain intermittent  Concern with decreased dexterity progressively; however stable over last 6 mo  We had a long discussion of options surveillance, fall precaution, PT vs surgery  He has cord compression with severe stenosis 3-4 and stenosis C5-6 6-7, 4-5 appears autofused  Prominent ligament at C3-4  Discussed risk/benefit of anterior vs posterior surgery  Will require multilevel fusion, if C4-5 is autofused possible 3-4 standalone 5-7 ACDF , vs pcf  Will get xrays and CT for planning    He is very functional   Did have episode of hypotension , mild bradycardia in ED 3/22; no recurrent episodes  He has Cardiology appt first week of may  Will have that evaluation and assessment for surgical clearance        The diagnosis, goals, limitations, risks and benefits of surgery and alternative treatment options where discussed at length (pros/cons). All questions/concerns were addressed. The patient has verbalized a good understanding of the diagnosis, the potential  procedure, anticipated post-operative course, overall expectations, and risks including but not limited to:  Dysphagia, dysphonia, Cervical palsy, nerve root injury/paralysis, death, nerve injury leading to pain or neurological deficit, csf leak, vascular injury or serious bleeding/need for blood product transfusion/stroke, wrong level surgery, hardware/instrumenteation misplacement, chronic pain/failure to improve or worsening of symptoms, infection, pseudoarthrosis, hardware failure, need for further surgery at the same or different levels; medical (eg Heart  attack, blood clot, infection) and anesthetic complications.          Abram Ellison MD  Neurosurgery

## 2022-05-04 ENCOUNTER — HOSPITAL ENCOUNTER (OUTPATIENT)
Dept: RADIOLOGY | Facility: HOSPITAL | Age: 82
Discharge: HOME | End: 2022-05-04
Attending: STUDENT IN AN ORGANIZED HEALTH CARE EDUCATION/TRAINING PROGRAM
Payer: MEDICARE

## 2022-05-04 DIAGNOSIS — M48.02 CERVICAL SPINAL STENOSIS: ICD-10-CM

## 2022-05-04 DIAGNOSIS — M54.2 CERVICALGIA: ICD-10-CM

## 2022-05-04 PROCEDURE — 72125 CT NECK SPINE W/O DYE: CPT | Mod: TC,PO

## 2022-05-04 PROCEDURE — 72050 X-RAY EXAM NECK SPINE 4/5VWS: CPT | Mod: TC,FY,PO

## 2022-05-04 PROCEDURE — 72050 XR CERVICAL SPINE AP LAT WITH FLEX EXTEN: ICD-10-PCS | Mod: 26,,, | Performed by: RADIOLOGY

## 2022-05-04 PROCEDURE — 72050 X-RAY EXAM NECK SPINE 4/5VWS: CPT | Mod: 26,,, | Performed by: RADIOLOGY

## 2022-05-04 PROCEDURE — 72125 CT NECK SPINE W/O DYE: CPT | Mod: 26,,, | Performed by: RADIOLOGY

## 2022-05-04 PROCEDURE — 72125 CT CERVICAL SPINE WITHOUT CONTRAST: ICD-10-PCS | Mod: 26,,, | Performed by: RADIOLOGY

## 2022-05-06 ENCOUNTER — OFFICE VISIT (OUTPATIENT)
Dept: NEUROSURGERY | Facility: CLINIC | Age: 82
End: 2022-05-06
Payer: MEDICARE

## 2022-05-06 VITALS
BODY MASS INDEX: 25.74 KG/M2 | WEIGHT: 190.06 LBS | SYSTOLIC BLOOD PRESSURE: 125 MMHG | HEIGHT: 72 IN | RESPIRATION RATE: 18 BRPM | HEART RATE: 64 BPM | DIASTOLIC BLOOD PRESSURE: 83 MMHG

## 2022-05-06 DIAGNOSIS — G99.2 STENOSIS OF CERVICAL SPINE WITH MYELOPATHY: ICD-10-CM

## 2022-05-06 DIAGNOSIS — R79.1 ABNORMAL COAGULATION PROFILE: ICD-10-CM

## 2022-05-06 DIAGNOSIS — M50.30 DDD (DEGENERATIVE DISC DISEASE), CERVICAL: Primary | ICD-10-CM

## 2022-05-06 DIAGNOSIS — M48.02 CERVICAL SPINAL STENOSIS: Primary | ICD-10-CM

## 2022-05-06 DIAGNOSIS — M48.02 STENOSIS OF CERVICAL SPINE WITH MYELOPATHY: ICD-10-CM

## 2022-05-06 PROCEDURE — 99214 PR OFFICE/OUTPT VISIT, EST, LEVL IV, 30-39 MIN: ICD-10-PCS | Mod: S$PBB,,, | Performed by: STUDENT IN AN ORGANIZED HEALTH CARE EDUCATION/TRAINING PROGRAM

## 2022-05-06 PROCEDURE — 99214 OFFICE O/P EST MOD 30 MIN: CPT | Mod: S$PBB,,, | Performed by: STUDENT IN AN ORGANIZED HEALTH CARE EDUCATION/TRAINING PROGRAM

## 2022-05-06 RX ORDER — SODIUM CHLORIDE, SODIUM LACTATE, POTASSIUM CHLORIDE, CALCIUM CHLORIDE 600; 310; 30; 20 MG/100ML; MG/100ML; MG/100ML; MG/100ML
INJECTION, SOLUTION INTRAVENOUS CONTINUOUS
Status: CANCELLED | OUTPATIENT
Start: 2022-05-06

## 2022-05-06 RX ORDER — LIDOCAINE HYDROCHLORIDE 10 MG/ML
1 INJECTION, SOLUTION EPIDURAL; INFILTRATION; INTRACAUDAL; PERINEURAL ONCE
Status: CANCELLED | OUTPATIENT
Start: 2022-05-06 | End: 2022-05-06

## 2022-05-06 NOTE — PROGRESS NOTES
[facility]  Clinic Consult     Consult Requested By: Eh Love MD, No ref. provider found        SUBJECTIVE:     Chief Complaint:   Chief Complaint   Patient presents with    discuss surgery     Discuss surgery. Numbness, tingling, both arms, hands. Patient denies pain in neck, arms.   reviewed complete imaging, complexity of multilevel stenosis and options  Cleared by Cardiology Dr. Trujillo    CT, Xrays and MRI reviewed    He is concenred about dexterity and progressive sensory loss   hand fnx worse; was playing [employer]  But cant now    Very active, lifts weights   In great health for his age      History of Present Illness:      SCAR Terrazas is a 81 y.o. male who presents with evaluation of cervical stenosis with myelopathy  He presents with upper extremity numbness over the past year.  He has intermittent pain from the neck down the right arm to the fingers.  He has noted a progression of poor dexterity in both hands difficulty with fine motor tasks struggles with buttoning buttons.  Feels like this got worse  However stable over the last 6 months  High he feels that when he drives his arms get weaker and hands      He has concerns of balance falls progression of his symptoms was referred a cervical MRI with severe stenosis    He is very functional still works out at the gym, lifts weights  Enjoys playing golf          Review of patient's allergies indicates:   Allergen Reactions    Tetanus antitoxin      This was as a child, uses the newer tetanus vaccine.       Past Medical History:   Diagnosis Date    Cataract     Detached retina, bilateral     Glaucoma     Restless leg syndrome      Past Surgical History:   Procedure Laterality Date    CATARACT EXTRACTION Bilateral     NASAL SEPTUM SURGERY  07/05/2017    SHOULDER ARTHROSCOPY Right 03/16/2018    RCR    TRANSFORAMINAL EPIDURAL INJECTION OF STEROID Bilateral 12/8/2021    Procedure:  Injection,steroid,epidural,transforaminal approach L3/4;  Surgeon: Pedro Elizalde MD;  Location: [facility] OR;  Service: Pain Management;  Laterality: Bilateral;    TRANSFORAMINAL EPIDURAL INJECTION OF STEROID Bilateral 3/15/2022    Procedure: Injection,steroid,epidural,transforaminal approach L3/4;  Surgeon: Pedro Elizalde MD;  Location: [facility] OR;  Service: Pain Management;  Laterality: Bilateral;    VARICOCELECTOMY       Family History   Problem Relation Age of Onset    Heart disease Father      Social History     Tobacco Use    Smoking status: Never Smoker    Smokeless tobacco: Never Used   Substance Use Topics    Alcohol use: Yes     Alcohol/week: 4.0 standard drinks     Types: 4 Glasses of wine per week    Drug use: No            OBJECTIVE:     Vital Signs (Most Recent):  Pulse: 64 (05/06/22 1116)  Resp: 18 (05/06/22 1116)  BP: 125/83 (05/06/22 1116)    Physical Exam:      General: well developed, well nourished, no distress. .  Mental Status: Awake, Alert, Oriented x 4  Language: No aphasia  Speech: No dysarthria  Head: normocephalic, atraumatic.  Neck: trachea midline, no JVD   Cardiovascular: no LE edema  Pulmonary: normal respirations, no signs of respiratory distress  Abdomen: soft, non-distended  Skin: Skin is warm, dry and intact.    Motor Strength:  No abnormal movements seen.       Difficulty raising over heads  Strength  Deltoids Triceps Biceps Wrist Extension Wrist Flexion Hand  Interossei     Upper: R 4-/5 5/5 5/5 5/5 5/5 5/5 5/5      L 4-/5 5/5 5/5 5/5 5/5 5/5 5/5       Iliopsoas Quadriceps Knee  Flexion Tibialis  anterior Gastro- cnemius EHL  Foot Eversion Foot inversion   Lower: R 5/5 5/5 5/5 5/5 5/5 5/5 5/5 5/5 5/5    L 5/5 5/5 5/5 5/5 5/5 5/5 5/5 5/5 5/5   Dec lt/pp below elbows      DTR's: 2 + and  in UE and LE bicep 3+  Hays: absent +right  Clonus: absent  Babinski: absent    Gait: independent           Diagnostic Results:  I have independently reviewed the following  imaging:  Mri    Multilevel degeneration  Severe stenosis C3-4 circumferential with ligament buckling  C4-5 severe DDD appears autofusion no cord compression    C5-6 and C6-7 DDD disc osteophyte with loss of functional reserve , cord compression          ASSESSMENT/PLAN:     DDD (degenerative disc disease), cervical    Stenosis of cervical spine with myelopathy      Hx of back pain , managed conservatively  1 yr of s/s of cervical myelopathy with loss of sensation in UE, right radiating pain intermittent  Concern with decreased dexterity progressively; however stable over last 6 mo  We had a long discussion of options surveillance, fall precaution, PT vs surgery  He has cord compression with severe stenosis 3-4 and stenosis C5-6 6-7, 4-5 appears autofused  Prominent ligament at C3-4  Discussed risk/benefit of anterior vs posterior surgery  Will require multilevel fusion, if C4-5 is autofused possible 3-4 standalone 5-7 ACDF , vs pcf  Will get xrays and CT for planning    He is very functional   Did have episode of hypotension , mild bradycardia in ED 3/22; no recurrent episodes  He has Cardiology appt first week of may  Will have that evaluation and assessment for surgical clearance      A/P today  Following complete image review and pt goals  Cervical stenosis with myelopathy progression  He has severe stenosis with prominent dorsal compression at C3-4  Suspicious for csc at 3-4 and 5-6  Severe degeneration with stenosis at C5-6 and 6-7  autofusion 2-3 and 4-5  He will require mulitlevel decompression and fusion  Risk, benefit goals reviewed , including non op treatment and surgery options  He will proceed with C2/3- 7 decompression , 3-1, possible C2-T2 instrumented fusion  All indicated  Has been cleared by cards and wants to proceed asap        The diagnosis, goals, limitations, risks and benefits of surgery and alternative treatment options where discussed at length (pros/cons). All questions/concerns were  addressed. The patient has verbalized a good understanding of the diagnosis, the potential  procedure, anticipated post-operative course, overall expectations, and risks including but not limited to:  Dysphagia, dysphonia, Cervical palsy, nerve root injury/paralysis, death, nerve injury leading to pain or neurological deficit, csf leak, vascular injury or serious bleeding/need for blood product transfusion/stroke, wrong level surgery, hardware/instrumenteation misplacement, chronic pain/failure to improve or worsening of symptoms, infection, pseudoarthrosis, hardware failure, need for further surgery at the same or different levels; medical (eg Heart attack, blood clot, infection) and anesthetic complications.          Abram Ellison MD  Neurosurgery

## 2022-05-09 DIAGNOSIS — M48.02 CERVICAL SPINAL STENOSIS: Primary | ICD-10-CM

## 2022-05-09 DIAGNOSIS — Z98.1 S/P CERVICAL SPINAL FUSION: ICD-10-CM

## 2022-05-18 PROBLEM — K21.9 GASTROESOPHAGEAL REFLUX DISEASE WITHOUT ESOPHAGITIS: Status: ACTIVE | Noted: 2022-05-18

## 2022-05-18 PROBLEM — M48.02 CERVICAL SPINAL STENOSIS: Status: ACTIVE | Noted: 2022-05-18

## 2022-05-18 PROBLEM — N40.0 BPH (BENIGN PROSTATIC HYPERPLASIA): Status: ACTIVE | Noted: 2022-05-18

## 2022-05-19 PROBLEM — E83.51 HYPOCALCEMIA: Status: ACTIVE | Noted: 2022-05-19

## 2022-05-19 PROBLEM — D64.9 ANEMIA: Status: ACTIVE | Noted: 2022-05-19

## 2022-05-19 PROBLEM — E87.1 HYPONATREMIA: Status: ACTIVE | Noted: 2022-05-19

## 2022-06-02 RX ORDER — OXYCODONE AND ACETAMINOPHEN 7.5; 325 MG/1; MG/1
1 TABLET ORAL EVERY 6 HOURS PRN
Qty: 28 TABLET | Refills: 0 | Status: SHIPPED | OUTPATIENT
Start: 2022-06-02 | End: 2022-06-30 | Stop reason: SDUPTHER

## 2022-06-02 NOTE — TELEPHONE ENCOUNTER
----- Message from Gerardo Bishop sent at 6/2/2022  2:17 PM CDT -----  Regarding: questions  Type: Needs Medical Advice    Who Called:  Juan Santoro (please be specific):  pain    Pharmacy name and phone #:    [id number] - [place], [place] - [address]   [address] [address]  Phone: [phone] Fax: [phone]    Best Call Back Number: [phone]     Additional Information: pt has 4 questions please call to discus.    1. wants to know if needs refill on oxy b/c still in pain.     2. Got call from [employer] [facility] out of [place], stating they did monitoring services during surgery. Wants pt to sign so they can bill his insurance. Please call to let know if provided service on date of surgery.    3. Has hard collar for 4wks then soft collar for 2wks. Wants to know if should be seen before collar comes off.    4. Pt is taking gabapentin 1in am and 2in pm. Pt does not fell like is helping. Discuss when call back.

## 2022-06-23 ENCOUNTER — HOSPITAL ENCOUNTER (OUTPATIENT)
Dept: RADIOLOGY | Facility: HOSPITAL | Age: 82
Discharge: HOME | End: 2022-06-23
Attending: STUDENT IN AN ORGANIZED HEALTH CARE EDUCATION/TRAINING PROGRAM
Payer: MEDICARE

## 2022-06-23 ENCOUNTER — OFFICE VISIT (OUTPATIENT)
Dept: NEUROSURGERY | Facility: CLINIC | Age: 82
End: 2022-06-23
Payer: MEDICARE

## 2022-06-23 VITALS
DIASTOLIC BLOOD PRESSURE: 92 MMHG | BODY MASS INDEX: 26.82 KG/M2 | SYSTOLIC BLOOD PRESSURE: 139 MMHG | HEART RATE: 76 BPM | RESPIRATION RATE: 14 BRPM | HEIGHT: 71 IN | WEIGHT: 191.56 LBS

## 2022-06-23 DIAGNOSIS — Z98.1 S/P CERVICAL SPINAL FUSION: ICD-10-CM

## 2022-06-23 DIAGNOSIS — M48.02 CERVICAL SPINAL STENOSIS: Primary | ICD-10-CM

## 2022-06-23 PROCEDURE — 99024 PR POST-OP FOLLOW-UP VISIT: ICD-10-PCS | Mod: POP,,, | Performed by: STUDENT IN AN ORGANIZED HEALTH CARE EDUCATION/TRAINING PROGRAM

## 2022-06-23 PROCEDURE — 99024 POSTOP FOLLOW-UP VISIT: CPT | Mod: POP,,, | Performed by: STUDENT IN AN ORGANIZED HEALTH CARE EDUCATION/TRAINING PROGRAM

## 2022-06-23 PROCEDURE — 72040 X-RAY EXAM NECK SPINE 2-3 VW: CPT | Mod: 26,,, | Performed by: RADIOLOGY

## 2022-06-23 PROCEDURE — 72040 X-RAY EXAM NECK SPINE 2-3 VW: CPT | Mod: TC,FY,PO

## 2022-06-23 PROCEDURE — 72040 XR CERVICAL SPINE AP LATERAL: ICD-10-PCS | Mod: 26,,, | Performed by: RADIOLOGY

## 2022-06-24 NOTE — PROGRESS NOTES
"REFERRING PHYSICIAN:    No ref. provider found  N/A    Postoperative visit     CLINICAL PROGRESS:   Juan Terrazas is now  6 weeks s/p decompression pcf  Doing well, has limted neck motion with brace  Has residual numbness in left fingers and a dull burning pain    Doesn't like meds   Feels ok    MEDICATIONS:                   List reviewed, see chart for dosing details.    ALLERGIES:       Review of patient's allergies indicates:   Allergen Reactions    Tetanus antitoxin      This was as a child, uses the newer tetanus vaccine.       PHYSICAL EXAMINATION:          VITAL SIGNS:   BP (!) 139/92   Pulse 76   Resp 14   Ht 5' 11" (1.803 m)   Wt 86.9 kg (191 lb 9.3 oz)   BMI 26.72 kg/m²     GENERAL:  Patient is well developed, well nourished, calm, collected, and in no apparent distress.  Incision is healed    NEUROLOGICAL:  Strength in the left upper extremity is interossei 5/5,  5/5, Bicep 5/5, Tricep 5/5, Deltoid 5/5.  Strength in the right upper extremity is interossei 5/5,  5/5, Bicep 5/5, Tricep 5/5, Deltoid 5/5.       Has pain in the upper right neck near suboccipital tendon attachment    Gait is intact    IMAGING DATA:  Stable alignment, intact hardware without subsidence      No flowsheet data found.      ASSESSMENT/PLAN:  Doing well  Xray look good  Feels better strength and coordination  Left finger numbness and left had burning- offerred neuropathic meds for symptoms tx; hopeful to continue improvement- declines  Right SCM/suboccit tendon attachment pain ttp  rec gradual ROM and PT treatments , removing collar  Activity escalation reviewed  Will f/u 3m , 6 mo xray      Advised to call the office Iif any questions or concerns arise.    This note was partially dictated using voice recognition software, so please excuse any errors that were not corrected.    "

## 2022-06-28 ENCOUNTER — TELEPHONE (OUTPATIENT)
Dept: NEUROSURGERY | Facility: CLINIC | Age: 82
End: 2022-06-28
Payer: MEDICARE

## 2022-06-28 NOTE — TELEPHONE ENCOUNTER
----- Message from Gerardo Bishop sent at 6/28/2022 11:01 AM CDT -----  Regarding: fax referral to [facility] office  Type: Needs Medical Advice    Who Called:  Anette with [facility]    Best Call Back Number: [phone]    Additional Information: PT has approved External Refferal on chart. But has not been faxed to [facility] so they can schedule pt. Please fax referral/orders to [phone]. Please call anette with questions.

## 2022-06-30 ENCOUNTER — TELEPHONE (OUTPATIENT)
Dept: NEUROSURGERY | Facility: CLINIC | Age: 82
End: 2022-06-30
Payer: MEDICARE

## 2022-06-30 RX ORDER — OXYCODONE AND ACETAMINOPHEN 7.5; 325 MG/1; MG/1
1 TABLET ORAL EVERY 8 HOURS PRN
Qty: 21 TABLET | Refills: 0 | Status: SHIPPED | OUTPATIENT
Start: 2022-06-30 | End: 2022-11-04

## 2022-06-30 NOTE — TELEPHONE ENCOUNTER
----- Message from Kimberly More LPN sent at 6/30/2022  2:44 PM CDT -----  Regarding: FW: refill    ----- Message -----  From: Gerardo Bishop  Sent: 6/30/2022   2:05 PM CDT  To: Scot Valverde Staff  Subject: refill                                           Type:  RX Refill Request    Who Called:  Juan  Refill or New Rx:  refill    RX Name and Strength:    oxyCODONE-acetaminophen (PERCOCET) 7.5-325 mg per tablet 28 tablet 0 6/2/2022  Sig - Route: Take 1 tablet by mouth every 6 (six) hours as needed for Pain. - Oral   Sent to pharmacy as: oxyCODONE-acetaminophen (PERCOCET) 7.5-325 mg per tablet   Earliest Fill Date: 6/2/2022   Notes to Pharmacy: Quantity prescribed more than 7 day supply? No   E-Prescribing Status: Receipt confirmed by pharmacy (6/2/2022  3:53 PM CDT)     How is the patient currently taking it? (ex. 1XDay):  see above    Is this a 30 day or 90 day RX:  30    Preferred Pharmacy with phone number:    [id number] - [place], [place] - [address] [place] [address] AT [address] [address] [address] [address]  [id number] [place] [id number]  Phone: [phone] Fax: [phone]    Local or Mail Order:  local    Ordering Provider:  Dr Scot Todd Call Back Number:  [phone]    Additional Information:  pt states is only taking 1 at night now. But is starting Physical Therapy and may feel need to continue through PT.

## 2022-08-24 ENCOUNTER — TELEPHONE (OUTPATIENT)
Dept: NEUROSURGERY | Facility: CLINIC | Age: 82
End: 2022-08-24
Payer: MEDICARE

## 2022-08-24 NOTE — TELEPHONE ENCOUNTER
Patient reports that he fell back on his tailbone while walking his dog. Denies any increase of pain. States that he does not believe he needs additional imaging.

## 2022-08-25 ENCOUNTER — TELEPHONE (OUTPATIENT)
Dept: PAIN MEDICINE | Facility: CLINIC | Age: 82
End: 2022-08-25
Payer: MEDICARE

## 2022-08-25 NOTE — TELEPHONE ENCOUNTER
----- Message from Venu Mckeon sent at 8/24/2022  4:11 PM CDT -----  Contact: self  Patient is rq a call from the back about a fall he had on his back. Please call at [phone].

## 2022-08-25 NOTE — TELEPHONE ENCOUNTER
Spoke with pt - states that he had a fall on Tuesday, but is going to wait over the weekend to see if pain improves and if not, will schedule a follow up appointment.

## 2022-08-30 ENCOUNTER — TELEPHONE (OUTPATIENT)
Dept: PAIN MEDICINE | Facility: CLINIC | Age: 82
End: 2022-08-30
Payer: MEDICARE

## 2022-09-07 ENCOUNTER — TELEPHONE (OUTPATIENT)
Dept: PAIN MEDICINE | Facility: CLINIC | Age: 82
End: 2022-09-07

## 2022-09-07 NOTE — TELEPHONE ENCOUNTER
----- Message from Ailyn Dela Cruz sent at 9/7/2022  9:22 AM CDT -----  Name Of Caller: Juan     Provider Name: ARAM PIERRE     Does patient feel the need to be seen today? YES     Relationship to the Pt?: pt    Contact Preference?: [phone]    What is the nature of the call?:Pt called and would like to be seen as soon as possible for his back pain he can hardly walk now. He said to send him a message on my chart with appt time and date. He wants something sooner than what I can offer which was 9/23/22

## 2022-09-20 ENCOUNTER — OFFICE VISIT (OUTPATIENT)
Dept: PAIN MEDICINE | Facility: CLINIC | Age: 82
End: 2022-09-20
Payer: MEDICARE

## 2022-09-20 VITALS
HEART RATE: 69 BPM | WEIGHT: 178.81 LBS | HEIGHT: 70 IN | DIASTOLIC BLOOD PRESSURE: 75 MMHG | OXYGEN SATURATION: 96 % | SYSTOLIC BLOOD PRESSURE: 157 MMHG | BODY MASS INDEX: 25.6 KG/M2

## 2022-09-20 DIAGNOSIS — M54.16 LUMBAR RADICULOPATHY: Primary | ICD-10-CM

## 2022-09-20 DIAGNOSIS — M51.36 DDD (DEGENERATIVE DISC DISEASE), LUMBAR: ICD-10-CM

## 2022-09-20 DIAGNOSIS — M48.061 SPINAL STENOSIS OF LUMBAR REGION WITHOUT NEUROGENIC CLAUDICATION: ICD-10-CM

## 2022-09-20 PROCEDURE — 99214 PR OFFICE/OUTPT VISIT, EST, LEVL IV, 30-39 MIN: ICD-10-PCS | Mod: S$PBB,,, | Performed by: PHYSICIAN ASSISTANT

## 2022-09-20 PROCEDURE — 99214 OFFICE O/P EST MOD 30 MIN: CPT | Mod: PBBFAC,PN | Performed by: PHYSICIAN ASSISTANT

## 2022-09-20 PROCEDURE — 99999 PR PBB SHADOW E&M-EST. PATIENT-LVL IV: ICD-10-PCS | Mod: PBBFAC,,, | Performed by: PHYSICIAN ASSISTANT

## 2022-09-20 PROCEDURE — 99214 OFFICE O/P EST MOD 30 MIN: CPT | Mod: S$PBB,,, | Performed by: PHYSICIAN ASSISTANT

## 2022-09-20 PROCEDURE — 99999 PR PBB SHADOW E&M-EST. PATIENT-LVL IV: CPT | Mod: PBBFAC,,, | Performed by: PHYSICIAN ASSISTANT

## 2022-09-20 RX ORDER — ALPRAZOLAM 0.5 MG/1
1 TABLET, ORALLY DISINTEGRATING ORAL ONCE AS NEEDED
Status: CANCELLED | OUTPATIENT
Start: 2022-09-20 | End: 2034-02-16

## 2022-09-22 ENCOUNTER — TELEPHONE (OUTPATIENT)
Dept: PAIN MEDICINE | Facility: CLINIC | Age: 82
End: 2022-09-22
Payer: MEDICARE

## 2022-09-22 NOTE — TELEPHONE ENCOUNTER
----- Message from Jessica Gambino sent at 9/22/2022  3:24 PM CDT -----  Regarding: questions  Contact: [phone]  Pt Questions    Questions:Pt calling to speak with the provider about some medication   Call Back number: [phone]

## 2022-09-23 ENCOUNTER — TELEPHONE (OUTPATIENT)
Dept: PAIN MEDICINE | Facility: CLINIC | Age: 82
End: 2022-09-23
Payer: MEDICARE

## 2022-09-23 NOTE — TELEPHONE ENCOUNTER
Pt will be taking ABT's for 5 days. He is scheduled for an extraction with preventative antibiotics on 9-27-22. His procedure with Dr. Elizalde is scheduled for 10-5-22. Message forwarded to Dr. Elizalde for review and instructions on how to proceed.

## 2022-09-23 NOTE — TELEPHONE ENCOUNTER
That is fine, he can proceed with the procedure and this is preventative antibiotics not treating an infection.

## 2022-09-23 NOTE — TELEPHONE ENCOUNTER
Per Dr. Elizalde and SERGIO's for oral surgery, Pt can proceed with the procedure and this is preventative antibiotics not treating an infection.Pt will proceed on 10-5-22. Pt verbalized understanding.

## 2022-09-25 NOTE — H&P (VIEW-ONLY)
This note was completed with dictation software and grammatical errors may exist.    CC:  Back pain    HPI:  The patient is an 81-year-old man with a history of glaucoma, cataracts presents in referral from Dr. Eh Love for back pain.  He returns in follow-up today with back pain.  Since his last visit, he underwent C3-T1 posterior cervical fusion with Dr. Ellison on 05/17/2022 and states that in terms of his neck he is doing well.  However, he states that at least 3 weeks ago he fell over backwards and has been having significant back pain ever since.  He has been going to physical therapy 3 times a week.  He describes pain across the low back radiating to the sacrum and bilateral buttocks.  This is worse with standing walking, improved with sitting.  He denies weakness or numbness.  He was seen at the emergency department for his fall and no fractures were found.    Prior HPI: The patient reports that he has been active for many years, was of runner even up until his 70s.  He states that 5 years ago he was running and stepped into a hole and felt sudden right hip pain, felt like his hip was shifted to the right.  He states that ever since this time, he has had back pain.  The back pain is located primarily across the bilateral low back and radiates out to the right lateral buttock and hip at times.  He used to have pain radiating along his hamstrings but no longer has this.  He denies any pain radiating into his lower legs, no numbness or weakness.  He states that he feels stiff when he wakes up in the morning but is able to exercise and do activities throughout the day without issue.  However, his pain can come randomly such as when bending over he can feel a sudden sharp pain.  If he has pain, he can do stretches that can often relieve this.  It seems like his pain is mostly worse with extension.  He denies any bowel or bladder incontinence, no weakness.  He does not take any pain medication on a regular  basis.  He exercise on a regular basis now doing stationary bike, weightlifting.    Pain intervention history:   He is status post bilateral L3/4 transforaminal epidural steroid injections on 12/08/2021 with 85% relief.    He is status post bilateral L3/4 transforaminal epidural steroid injections on 03/15/2022 with at least 50% relief.    Spine surgeries:  He has seen Dr. Claude Owusu who suggested that he continue exercising, no surgery needed.    Antineuropathics: Gabapentin 100mg qhs  NSAIDs: Mobic 15  Physical therapy:  He had gone to [facility] physical therapy, also chiropractic care over the last several months.  Antidepressants:  Muscle relaxers:  Opioids:  Antiplatelets/Anticoagulants:    ROS:  He reports back pain, joint stiffness.  Balance of review of systems is negative.    No results found for: LABA1C, HGBA1C    Lab Results   Component Value Date    WBC 10.74 05/20/2022    HGB 12.5 (L) 05/20/2022    HCT 36.6 (L) 05/20/2022    MCV 92 05/20/2022     (L) 05/20/2022             Past Medical History:   Diagnosis Date    Arthritis     Cancer     prostate cancer    Cataract     Detached retina, bilateral     General anesthetics causing adverse effect in therapeutic use     GERD (gastroesophageal reflux disease)     Glaucoma     Restless leg syndrome        Past Surgical History:   Procedure Laterality Date    CARPAL TUNNEL RELEASE Left     CATARACT EXTRACTION Bilateral     EYE SURGERY      FUSION OF POSTERIOR COLUMN OF CERVICAL SPINE USING COMPUTER AIDED NAVIGATION N/A 5/17/2022    Procedure: FUSION, SPINE, POSTERIOR SPINAL COLUMN, CERVICAL, USING COMPUTER-ASSISTED NAVIGATION C3-4 C4-5 C5-6 C6-7 C7-T1;  Surgeon: Abram Ellison MD;  Location: [facility] OR;  Service: Neurosurgery;  Laterality: N/A;    NASAL SEPTUM SURGERY  07/05/2017    POSTERIOR CERVICAL LAMINECTOMY N/A 5/17/2022    Procedure: LAMINECTOMY, SPINE, CERVICAL, POSTERIOR APPROACH C3-T1;  Surgeon: Abram Ellison MD;  Location: [facility] OR;  Service:  "Neurosurgery;  Laterality: N/A;    SHOULDER ARTHROSCOPY Right 03/16/2018    RCR    TRANSFORAMINAL EPIDURAL INJECTION OF STEROID Bilateral 12/8/2021    Procedure: Injection,steroid,epidural,transforaminal approach L3/4;  Surgeon: Pedro Elizalde MD;  Location: [facility] OR;  Service: Pain Management;  Laterality: Bilateral;    TRANSFORAMINAL EPIDURAL INJECTION OF STEROID Bilateral 3/15/2022    Procedure: Injection,steroid,epidural,transforaminal approach L3/4;  Surgeon: Pedro Elizalde MD;  Location: [facility] OR;  Service: Pain Management;  Laterality: Bilateral;    VARICOCELECTOMY         Social History     Socioeconomic History    Marital status:    Tobacco Use    Smoking status: Never    Smokeless tobacco: Never   Substance and Sexual Activity    Alcohol use: Yes     Alcohol/week: 4.0 standard drinks     Types: 4 Glasses of wine per week    Drug use: No         Medications/Allergies: See med card    Vitals:    09/20/22 1507   BP: (!) 157/75   Pulse: 69   SpO2: 96%   Weight: 81.1 kg (178 lb 12.7 oz)   Height: 5' 10" (1.778 m)   PainSc:   8   PainLoc: Back         Physical exam:  Gen: A and O x3, pleasant, well-groomed  Skin: No rashes or obvious lesions  HEENT: PERRLA, no obvious deformities on ears or in canals.Trachea midline.  CVS: Regular rate and rhythm, normal palpable pulses.  Resp: Clear to auscultation bilaterally, no wheezes or rales.  Abdomen: Soft, NT/ND.  Musculoskeletal: Able to heel walk, toe walk. No antalgic gait.   Coordination   Romberg: negative  Finger to nose coordination: normal  Heel to shin coordination: somewhat off balance but able to perform  Tandem walking coordination: normal    Neuro:  Motor:    Right Left   C4 Shoulder Abduction  5  5   C5 Elbow Flexion    5  5   C6 Wrist Extension  5  5   C7 Elbow Extension   5  5   C8/T1 Hand Intrinsics   5  5   C8 First Dorsal Interosseus  5  5   C8 Abductor Pollicus Brevis  5  5       Iliopsoas Quadriceps Knee  Flexion Tibialis  anterior " Gastro- cnemius EHL   Lower: R 5/5 5/5 5/5 5/5 5/5 5/5    L 5/5 5/5 5/5 5/5 5/5 5/5        Left  Right    Triceps DTR 2+ 1+   Biceps DTR 2+ 1+   Brachioradialis DTR 2+ 1+   Patellar DTR 1+ 1+   Achilles DTR 0+ 0+   Hays Absent  Absent   Clonus Absent Absent   Babinski Absent Absent     Sensory: Intact and symmetrical to light touch and pinprick in C2-T1 dermatomes bilaterally. Intact and symmetrical to light touch and pinprick in L1-S1 dermatomes bilaterally.    Lumbar spine:  Lumbar spine: ROM is full with flexion extension and oblique extension with no increased pain.    Sammy's test causes no increased pain on either side.    Supine straight leg raise is negative bilaterally.    Internal and external rotation of the hip causes no increased pain on either side.  Myofascial exam: No tenderness to palpation across lumbar paraspinous muscles.      Imaging:  MRI lumbar spine 05/10/2019:  This is outside imaging, I reviewed this personally.  It appears that he has an S1/2 rudimentary disc.  At L5/S1 there is slight disc height loss and slight anterolisthesis, mild bilateral foraminal narrowing, no canal narrowing, there is facet arthropathy present.  At L4/5 there is a grade 1 anterolisthesis with uncovering of the disc and facet hypertrophy causing mild to moderate canal stenosis.  There is also severe disc height loss at this level.  At L3/4 there is disc height loss and facet hypertrophy, mild canal stenosis and bilateral foraminal stenosis.    03/24/2022 MRI cervical spine paradigm report  There is reversal the normal cervical lordosis with curvature to the left.  There is disc desiccation throughout the spine with disc space narrowing and anterior osteophytic spurring from C2-3 through C7-T1.  There are endplate Modic changes from C2-3 through C6-7.  Partial interbody fusion suggested at C4-5.  C2-3 central canal 8.3 millimeters, facet hypertrophic changes, moderate to severe right and severe left foraminal  stenosis.  C3-4 there is a 3.2 millimeter disc protrusion with uncovertebral spurring with she contacts and displaces the cord, central canal 3.9 millimeters, facet hypertrophic changes, severe right and moderate to severe left foraminal stenosis.  C4-5 1 millimeter of uncovertebral spurring which contacts the cord, central canal measures 7.4 millimeters, facet hypertrophic changes, moderate to severe right and moderate left foraminal stenosis.  C5-6 there is a 3.1 millimeter disc protrusion with uncovertebral spurring which effaces the cord, central canal 7.3 millimeters, facet hypertrophic changes, severe bilateral foraminal stenosis.  C6-7 there is a 3.4 millimeter disc protrusion with uncovertebral spurring which contacts the cord, central canal 7.1 millimeters, facet hypertrophic changes, severe bilateral foraminal stenosis.  C7-T1 there is a 1.1 millimeter disc bulge which effaces the dural sac, facet hypertrophic changes, severe bilateral foraminal stenosis.    09/07/2022 CT lumbar spine   There are 5 non rib-bearing lumbar vertebrae.  The S1 segment is partially lumbarized.  Considering the above nomenclature there is a approximately 6 mm anterolisthesis of L4 on L5.  The alignment of the rest of the vertebral bodies is within normal limits.  Vertebral body heights are within normal limits.  No fractures are noted.  Multilevel degenerative disc disease throughout the lumbar spine.  Paraspinal soft tissues are unremarkable.  There is calcific atherosclerosis of the abdominal aorta and the iliac arteries.  There are multiple right renal cysts and suggestion of a peripelvic left renal cyst the right lower pole cyst measures at least 5.2 cm.  There is a calcific rim along the posterior wall of the cyst.  There is also a speck of calcification in the left renal pelvis, suspicious for a nonobstructing left renal calculus.  If further evaluation is necessary a dedicated ultrasound or CT scan of the kidneys is  suggested.     L5-S1: Spondylotic changes associated with mild disc space narrowing.  Circumferential annular disc bulge right greater than left.  Bilateral mild foraminal stenosis.  There is facet arthropathy.  No significant central canal spinal stenosis.     L4-5: Approximately 6 mm anterolisthesis of L4 on L5 associated with disc degeneration and vacuum phenomena and marginal anterior and posterior osteophytes.  There is a broad-based posterior osteophyte and disc complex the with compression of the thecal sac.  Degenerative hypertrophic facet arthropathy and hypertrophy ligamentum flava results in a severe central canal spinal stenosis (image 38 series 2).  At least moderate bilateral foraminal stenosis.     L3-4: Disc degeneration with disc space narrowing and a circumferential annular disc bulge.  Hypertrophic changes of the facet joints and ligamentum flava results in a severe central canal spinal stenosis (image 30 series 2).  There is also bilateral foraminal stenosis.     L2-3: There is a 1-2 mm retrolisthesis of L2 on L3.  Disc degeneration associated with vacuum phenomena and a broad-based posterior osteophyte/disc complex.  At least moderate central canal spinal stenosis.  There is facet arthropathy.  Bilateral foraminal stenosis.     L1-2: Spondylosis associated with marginal anterior spondylotic osteophytes.  Mild posterior bulging of the annulus.  No significant central canal spinal stenosis.     T12-L1: Spondylosis associated with marginal anterior bridging osteophytes.  No significant disc herniations or central canal spinal stenosis or significant foraminal stenotic disease.    Assessment:  The patient is an 81-year-old man with a history of glaucoma, cataracts presents in referral from Dr. Eh Love for back pain.      1. Lumbar radiculopathy  Vital signs    Verify informed consent    Notify physician     Notify physician     Notify physician (specify)    Diet NPO    Case Request Operating  Room: Injection-steroid-epidural-lumbar L5/S1    Place in Outpatient    alprazolam ODT dissolvable tablet 1 mg      2. DDD (degenerative disc disease), lumbar        3. Spinal stenosis of lumbar region without neurogenic claudication            Plan:  1. We discussed his most recent lumbar spine CT and he does have severe spinal stenosis at L3/4 and L4/5 likely causing his symptoms.  I am going to schedule him for an L5/S1 interlaminar epidural steroid injection.    2. Follow-up in 4 weeks postprocedure or sooner as needed.

## 2022-09-25 NOTE — PROGRESS NOTES
This note was completed with dictation software and grammatical errors may exist.    CC:  Back pain    HPI:  The patient is an 81-year-old man with a history of glaucoma, cataracts presents in referral from Dr. Eh Love for back pain.  He returns in follow-up today with back pain.  Since his last visit, he underwent C3-T1 posterior cervical fusion with Dr. Ellison on 05/17/2022 and states that in terms of his neck he is doing well.  However, he states that at least 3 weeks ago he fell over backwards and has been having significant back pain ever since.  He has been going to physical therapy 3 times a week.  He describes pain across the low back radiating to the sacrum and bilateral buttocks.  This is worse with standing walking, improved with sitting.  He denies weakness or numbness.  He was seen at the emergency department for his fall and no fractures were found.    Prior HPI: The patient reports that he has been active for many years, was of runner even up until his 70s.  He states that 5 years ago he was running and stepped into a hole and felt sudden right hip pain, felt like his hip was shifted to the right.  He states that ever since this time, he has had back pain.  The back pain is located primarily across the bilateral low back and radiates out to the right lateral buttock and hip at times.  He used to have pain radiating along his hamstrings but no longer has this.  He denies any pain radiating into his lower legs, no numbness or weakness.  He states that he feels stiff when he wakes up in the morning but is able to exercise and do activities throughout the day without issue.  However, his pain can come randomly such as when bending over he can feel a sudden sharp pain.  If he has pain, he can do stretches that can often relieve this.  It seems like his pain is mostly worse with extension.  He denies any bowel or bladder incontinence, no weakness.  He does not take any pain medication on a regular  basis.  He exercise on a regular basis now doing stationary bike, weightlifting.    Pain intervention history:   He is status post bilateral L3/4 transforaminal epidural steroid injections on 12/08/2021 with 85% relief.    He is status post bilateral L3/4 transforaminal epidural steroid injections on 03/15/2022 with at least 50% relief.    Spine surgeries:  He has seen Dr. Claude Owusu who suggested that he continue exercising, no surgery needed.    Antineuropathics: Gabapentin 100mg qhs  NSAIDs: Mobic 15  Physical therapy:  He had gone to [facility] physical therapy, also chiropractic care over the last several months.  Antidepressants:  Muscle relaxers:  Opioids:  Antiplatelets/Anticoagulants:    ROS:  He reports back pain, joint stiffness.  Balance of review of systems is negative.    No results found for: LABA1C, HGBA1C    Lab Results   Component Value Date    WBC 10.74 05/20/2022    HGB 12.5 (L) 05/20/2022    HCT 36.6 (L) 05/20/2022    MCV 92 05/20/2022     (L) 05/20/2022             Past Medical History:   Diagnosis Date    Arthritis     Cancer     prostate cancer    Cataract     Detached retina, bilateral     General anesthetics causing adverse effect in therapeutic use     GERD (gastroesophageal reflux disease)     Glaucoma     Restless leg syndrome        Past Surgical History:   Procedure Laterality Date    CARPAL TUNNEL RELEASE Left     CATARACT EXTRACTION Bilateral     EYE SURGERY      FUSION OF POSTERIOR COLUMN OF CERVICAL SPINE USING COMPUTER AIDED NAVIGATION N/A 5/17/2022    Procedure: FUSION, SPINE, POSTERIOR SPINAL COLUMN, CERVICAL, USING COMPUTER-ASSISTED NAVIGATION C3-4 C4-5 C5-6 C6-7 C7-T1;  Surgeon: Abram Ellison MD;  Location: [facility] OR;  Service: Neurosurgery;  Laterality: N/A;    NASAL SEPTUM SURGERY  07/05/2017    POSTERIOR CERVICAL LAMINECTOMY N/A 5/17/2022    Procedure: LAMINECTOMY, SPINE, CERVICAL, POSTERIOR APPROACH C3-T1;  Surgeon: Abram Ellison MD;  Location: [facility] OR;  Service:  "Neurosurgery;  Laterality: N/A;    SHOULDER ARTHROSCOPY Right 03/16/2018    RCR    TRANSFORAMINAL EPIDURAL INJECTION OF STEROID Bilateral 12/8/2021    Procedure: Injection,steroid,epidural,transforaminal approach L3/4;  Surgeon: Pedro Elizalde MD;  Location: [facility] OR;  Service: Pain Management;  Laterality: Bilateral;    TRANSFORAMINAL EPIDURAL INJECTION OF STEROID Bilateral 3/15/2022    Procedure: Injection,steroid,epidural,transforaminal approach L3/4;  Surgeon: Pedro Elizalde MD;  Location: [facility] OR;  Service: Pain Management;  Laterality: Bilateral;    VARICOCELECTOMY         Social History     Socioeconomic History    Marital status:    Tobacco Use    Smoking status: Never    Smokeless tobacco: Never   Substance and Sexual Activity    Alcohol use: Yes     Alcohol/week: 4.0 standard drinks     Types: 4 Glasses of wine per week    Drug use: No         Medications/Allergies: See med card    Vitals:    09/20/22 1507   BP: (!) 157/75   Pulse: 69   SpO2: 96%   Weight: 81.1 kg (178 lb 12.7 oz)   Height: 5' 10" (1.778 m)   PainSc:   8   PainLoc: Back         Physical exam:  Gen: A and O x3, pleasant, well-groomed  Skin: No rashes or obvious lesions  HEENT: PERRLA, no obvious deformities on ears or in canals.Trachea midline.  CVS: Regular rate and rhythm, normal palpable pulses.  Resp: Clear to auscultation bilaterally, no wheezes or rales.  Abdomen: Soft, NT/ND.  Musculoskeletal: Able to heel walk, toe walk. No antalgic gait.   Coordination   Romberg: negative  Finger to nose coordination: normal  Heel to shin coordination: somewhat off balance but able to perform  Tandem walking coordination: normal    Neuro:  Motor:    Right Left   C4 Shoulder Abduction  5  5   C5 Elbow Flexion    5  5   C6 Wrist Extension  5  5   C7 Elbow Extension   5  5   C8/T1 Hand Intrinsics   5  5   C8 First Dorsal Interosseus  5  5   C8 Abductor Pollicus Brevis  5  5       Iliopsoas Quadriceps Knee  Flexion Tibialis  anterior " Gastro- cnemius EHL   Lower: R 5/5 5/5 5/5 5/5 5/5 5/5    L 5/5 5/5 5/5 5/5 5/5 5/5        Left  Right    Triceps DTR 2+ 1+   Biceps DTR 2+ 1+   Brachioradialis DTR 2+ 1+   Patellar DTR 1+ 1+   Achilles DTR 0+ 0+   Hays Absent  Absent   Clonus Absent Absent   Babinski Absent Absent     Sensory: Intact and symmetrical to light touch and pinprick in C2-T1 dermatomes bilaterally. Intact and symmetrical to light touch and pinprick in L1-S1 dermatomes bilaterally.    Lumbar spine:  Lumbar spine: ROM is full with flexion extension and oblique extension with no increased pain.    Sammy's test causes no increased pain on either side.    Supine straight leg raise is negative bilaterally.    Internal and external rotation of the hip causes no increased pain on either side.  Myofascial exam: No tenderness to palpation across lumbar paraspinous muscles.      Imaging:  MRI lumbar spine 05/10/2019:  This is outside imaging, I reviewed this personally.  It appears that he has an S1/2 rudimentary disc.  At L5/S1 there is slight disc height loss and slight anterolisthesis, mild bilateral foraminal narrowing, no canal narrowing, there is facet arthropathy present.  At L4/5 there is a grade 1 anterolisthesis with uncovering of the disc and facet hypertrophy causing mild to moderate canal stenosis.  There is also severe disc height loss at this level.  At L3/4 there is disc height loss and facet hypertrophy, mild canal stenosis and bilateral foraminal stenosis.    03/24/2022 MRI cervical spine paradigm report  There is reversal the normal cervical lordosis with curvature to the left.  There is disc desiccation throughout the spine with disc space narrowing and anterior osteophytic spurring from C2-3 through C7-T1.  There are endplate Modic changes from C2-3 through C6-7.  Partial interbody fusion suggested at C4-5.  C2-3 central canal 8.3 millimeters, facet hypertrophic changes, moderate to severe right and severe left foraminal  stenosis.  C3-4 there is a 3.2 millimeter disc protrusion with uncovertebral spurring with she contacts and displaces the cord, central canal 3.9 millimeters, facet hypertrophic changes, severe right and moderate to severe left foraminal stenosis.  C4-5 1 millimeter of uncovertebral spurring which contacts the cord, central canal measures 7.4 millimeters, facet hypertrophic changes, moderate to severe right and moderate left foraminal stenosis.  C5-6 there is a 3.1 millimeter disc protrusion with uncovertebral spurring which effaces the cord, central canal 7.3 millimeters, facet hypertrophic changes, severe bilateral foraminal stenosis.  C6-7 there is a 3.4 millimeter disc protrusion with uncovertebral spurring which contacts the cord, central canal 7.1 millimeters, facet hypertrophic changes, severe bilateral foraminal stenosis.  C7-T1 there is a 1.1 millimeter disc bulge which effaces the dural sac, facet hypertrophic changes, severe bilateral foraminal stenosis.    09/07/2022 CT lumbar spine   There are 5 non rib-bearing lumbar vertebrae.  The S1 segment is partially lumbarized.  Considering the above nomenclature there is a approximately 6 mm anterolisthesis of L4 on L5.  The alignment of the rest of the vertebral bodies is within normal limits.  Vertebral body heights are within normal limits.  No fractures are noted.  Multilevel degenerative disc disease throughout the lumbar spine.  Paraspinal soft tissues are unremarkable.  There is calcific atherosclerosis of the abdominal aorta and the iliac arteries.  There are multiple right renal cysts and suggestion of a peripelvic left renal cyst the right lower pole cyst measures at least 5.2 cm.  There is a calcific rim along the posterior wall of the cyst.  There is also a speck of calcification in the left renal pelvis, suspicious for a nonobstructing left renal calculus.  If further evaluation is necessary a dedicated ultrasound or CT scan of the kidneys is  suggested.     L5-S1: Spondylotic changes associated with mild disc space narrowing.  Circumferential annular disc bulge right greater than left.  Bilateral mild foraminal stenosis.  There is facet arthropathy.  No significant central canal spinal stenosis.     L4-5: Approximately 6 mm anterolisthesis of L4 on L5 associated with disc degeneration and vacuum phenomena and marginal anterior and posterior osteophytes.  There is a broad-based posterior osteophyte and disc complex the with compression of the thecal sac.  Degenerative hypertrophic facet arthropathy and hypertrophy ligamentum flava results in a severe central canal spinal stenosis (image 38 series 2).  At least moderate bilateral foraminal stenosis.     L3-4: Disc degeneration with disc space narrowing and a circumferential annular disc bulge.  Hypertrophic changes of the facet joints and ligamentum flava results in a severe central canal spinal stenosis (image 30 series 2).  There is also bilateral foraminal stenosis.     L2-3: There is a 1-2 mm retrolisthesis of L2 on L3.  Disc degeneration associated with vacuum phenomena and a broad-based posterior osteophyte/disc complex.  At least moderate central canal spinal stenosis.  There is facet arthropathy.  Bilateral foraminal stenosis.     L1-2: Spondylosis associated with marginal anterior spondylotic osteophytes.  Mild posterior bulging of the annulus.  No significant central canal spinal stenosis.     T12-L1: Spondylosis associated with marginal anterior bridging osteophytes.  No significant disc herniations or central canal spinal stenosis or significant foraminal stenotic disease.    Assessment:  The patient is an 81-year-old man with a history of glaucoma, cataracts presents in referral from Dr. Eh Love for back pain.      1. Lumbar radiculopathy  Vital signs    Verify informed consent    Notify physician     Notify physician     Notify physician (specify)    Diet NPO    Case Request Operating  Room: Injection-steroid-epidural-lumbar L5/S1    Place in Outpatient    alprazolam ODT dissolvable tablet 1 mg      2. DDD (degenerative disc disease), lumbar        3. Spinal stenosis of lumbar region without neurogenic claudication            Plan:  1. We discussed his most recent lumbar spine CT and he does have severe spinal stenosis at L3/4 and L4/5 likely causing his symptoms.  I am going to schedule him for an L5/S1 interlaminar epidural steroid injection.    2. Follow-up in 4 weeks postprocedure or sooner as needed.

## 2022-09-30 ENCOUNTER — TELEPHONE (OUTPATIENT)
Dept: SURGERY | Facility: HOSPITAL | Age: 82
End: 2022-09-30
Payer: MEDICARE

## 2022-09-30 RX ORDER — AMOXICILLIN 125 MG/1
125 TABLET, CHEWABLE ORAL 3 TIMES DAILY
Status: ON HOLD | COMMUNITY
End: 2022-10-05 | Stop reason: HOSPADM

## 2022-09-30 NOTE — TELEPHONE ENCOUNTER
This pt is scheduled for a Lumbar steroid injection on Wednesday 10/5 with Dr. Elizalde, he recently had oral surgery and is currently taking a Medrol Dosepack and Amoxicillin. Please reach out to the pt if he needs to reschedule. Thank you.

## 2022-09-30 NOTE — TELEPHONE ENCOUNTER
Spoke with pt - states that he is not taking a medrol dosepack. He was only given antibiotics preventatively. Per other message, ok to proceed if antibiotics are preventative.

## 2022-10-05 ENCOUNTER — HOSPITAL ENCOUNTER (OUTPATIENT)
Facility: HOSPITAL | Age: 82
Discharge: HOME | End: 2022-10-05
Attending: ANESTHESIOLOGY | Admitting: ANESTHESIOLOGY
Payer: MEDICARE

## 2022-10-05 ENCOUNTER — HOSPITAL ENCOUNTER (OUTPATIENT)
Dept: RADIOLOGY | Facility: HOSPITAL | Age: 82
Discharge: HOME | End: 2022-10-05
Attending: ANESTHESIOLOGY
Payer: MEDICARE

## 2022-10-05 VITALS
TEMPERATURE: 98 F | RESPIRATION RATE: 16 BRPM | HEIGHT: 70 IN | HEART RATE: 60 BPM | DIASTOLIC BLOOD PRESSURE: 90 MMHG | SYSTOLIC BLOOD PRESSURE: 156 MMHG | WEIGHT: 178 LBS | BODY MASS INDEX: 25.48 KG/M2 | OXYGEN SATURATION: 98 %

## 2022-10-05 DIAGNOSIS — M54.16 LUMBAR RADICULOPATHY: ICD-10-CM

## 2022-10-05 DIAGNOSIS — M54.50 LOWER BACK PAIN: ICD-10-CM

## 2022-10-05 PROCEDURE — 25000003 PHARM REV CODE 250: Mod: PO | Performed by: ANESTHESIOLOGY

## 2022-10-05 PROCEDURE — 76000 FLUOROSCOPY <1 HR PHYS/QHP: CPT | Mod: TC,PO

## 2022-10-05 PROCEDURE — 63600175 PHARM REV CODE 636 W HCPCS: Mod: PO | Performed by: ANESTHESIOLOGY

## 2022-10-05 PROCEDURE — 62323 NJX INTERLAMINAR LMBR/SAC: CPT | Mod: ,,, | Performed by: ANESTHESIOLOGY

## 2022-10-05 PROCEDURE — A4216 STERILE WATER/SALINE, 10 ML: HCPCS | Mod: PO | Performed by: ANESTHESIOLOGY

## 2022-10-05 PROCEDURE — 62323 NJX INTERLAMINAR LMBR/SAC: CPT | Mod: PO | Performed by: ANESTHESIOLOGY

## 2022-10-05 PROCEDURE — 25500020 PHARM REV CODE 255: Mod: PO | Performed by: ANESTHESIOLOGY

## 2022-10-05 PROCEDURE — 62323 PR INJ LUMBAR/SACRAL, W/IMAGING GUIDANCE: ICD-10-PCS | Mod: ,,, | Performed by: ANESTHESIOLOGY

## 2022-10-05 RX ORDER — ALPRAZOLAM 0.5 MG/1
1 TABLET, ORALLY DISINTEGRATING ORAL ONCE AS NEEDED
Status: DISCONTINUED | OUTPATIENT
Start: 2022-10-05 | End: 2022-10-05 | Stop reason: HOSPADM

## 2022-10-05 RX ORDER — METHYLPREDNISOLONE ACETATE 80 MG/ML
INJECTION, SUSPENSION INTRA-ARTICULAR; INTRALESIONAL; INTRAMUSCULAR; SOFT TISSUE
Status: DISCONTINUED | OUTPATIENT
Start: 2022-10-05 | End: 2022-10-05 | Stop reason: HOSPADM

## 2022-10-05 RX ORDER — LIDOCAINE HYDROCHLORIDE 10 MG/ML
INJECTION, SOLUTION EPIDURAL; INFILTRATION; INTRACAUDAL; PERINEURAL
Status: DISCONTINUED | OUTPATIENT
Start: 2022-10-05 | End: 2022-10-05 | Stop reason: HOSPADM

## 2022-10-05 RX ORDER — SODIUM CHLORIDE 9 MG/ML
INJECTION, SOLUTION INTRAMUSCULAR; INTRAVENOUS; SUBCUTANEOUS
Status: DISCONTINUED | OUTPATIENT
Start: 2022-10-05 | End: 2022-10-05 | Stop reason: HOSPADM

## 2022-10-05 NOTE — DISCHARGE SUMMARY
[facility] - Surgery  Discharge Note  Short Stay    Procedure(s) (LRB):  Injection-steroid-epidural-lumbar L5/S1 (N/A)      OUTCOME: Patient tolerated treatment/procedure well without complication and is now ready for discharge.    DISPOSITION: Home or Self Care    FINAL DIAGNOSIS:  Lumbar radiculopathy    FOLLOWUP: In clinic    DISCHARGE INSTRUCTIONS:    Discharge Procedure Orders   Diet Adult Regular     No dressing needed     Notify your health care provider if you experience any of the following:  temperature >100.4     Activity as tolerated

## 2022-10-05 NOTE — OP NOTE

## 2022-10-28 ENCOUNTER — OFFICE VISIT (OUTPATIENT)
Dept: PAIN MEDICINE | Facility: CLINIC | Age: 82
End: 2022-10-28
Payer: MEDICARE

## 2022-10-28 VITALS
HEART RATE: 66 BPM | WEIGHT: 178.13 LBS | HEIGHT: 70 IN | SYSTOLIC BLOOD PRESSURE: 148 MMHG | BODY MASS INDEX: 25.5 KG/M2 | DIASTOLIC BLOOD PRESSURE: 72 MMHG | OXYGEN SATURATION: 97 %

## 2022-10-28 DIAGNOSIS — M51.36 DDD (DEGENERATIVE DISC DISEASE), LUMBAR: ICD-10-CM

## 2022-10-28 DIAGNOSIS — M50.30 DDD (DEGENERATIVE DISC DISEASE), CERVICAL: ICD-10-CM

## 2022-10-28 DIAGNOSIS — M54.16 LUMBAR RADICULOPATHY: Primary | ICD-10-CM

## 2022-10-28 PROCEDURE — 99999 PR PBB SHADOW E&M-EST. PATIENT-LVL III: CPT | Mod: PBBFAC,,, | Performed by: PHYSICIAN ASSISTANT

## 2022-10-28 PROCEDURE — 99213 OFFICE O/P EST LOW 20 MIN: CPT | Mod: PBBFAC,PN | Performed by: PHYSICIAN ASSISTANT

## 2022-10-28 PROCEDURE — 99212 PR OFFICE/OUTPT VISIT, EST, LEVL II, 10-19 MIN: ICD-10-PCS | Mod: S$PBB,,, | Performed by: PHYSICIAN ASSISTANT

## 2022-10-28 PROCEDURE — 99212 OFFICE O/P EST SF 10 MIN: CPT | Mod: S$PBB,,, | Performed by: PHYSICIAN ASSISTANT

## 2022-10-28 PROCEDURE — 99999 PR PBB SHADOW E&M-EST. PATIENT-LVL III: ICD-10-PCS | Mod: PBBFAC,,, | Performed by: PHYSICIAN ASSISTANT

## 2022-10-28 RX ORDER — HYDROCODONE BITARTRATE AND ACETAMINOPHEN 7.5; 325 MG/1; MG/1
1 TABLET ORAL EVERY 6 HOURS PRN
COMMUNITY
Start: 2022-09-22 | End: 2023-05-19

## 2022-10-28 RX ORDER — AMOXICILLIN AND CLAVULANATE POTASSIUM 875; 125 MG/1; MG/1
TABLET, FILM COATED ORAL
COMMUNITY
Start: 2022-09-22 | End: 2022-11-04

## 2022-11-03 NOTE — PROGRESS NOTES
This note was completed with dictation software and grammatical errors may exist.    CC:  Back pain    HPI:  The patient is an 82-year-old man with a history of glaucoma, cataracts presents in referral from Dr. Eh Love for back pain.  He is status post L5/S1 interlaminar epidural steroid injection on 10/05/2022 with almost 100% relief.  He also has recovered well following his cervical spinal fusion and has completed physical therapy.  He continues to have some left hand numbness, hot and cold sensations but otherwise he is doing much better and ready to return to the gym.  He denies having any incontinence.  He does report some continued left upper extremity weakness but improving.    Prior HPI: The patient reports that he has been active for many years, was of runner even up until his 70s.  He states that 5 years ago he was running and stepped into a hole and felt sudden right hip pain, felt like his hip was shifted to the right.  He states that ever since this time, he has had back pain.  The back pain is located primarily across the bilateral low back and radiates out to the right lateral buttock and hip at times.  He used to have pain radiating along his hamstrings but no longer has this.  He denies any pain radiating into his lower legs, no numbness or weakness.  He states that he feels stiff when he wakes up in the morning but is able to exercise and do activities throughout the day without issue.  However, his pain can come randomly such as when bending over he can feel a sudden sharp pain.  If he has pain, he can do stretches that can often relieve this.  It seems like his pain is mostly worse with extension.  He denies any bowel or bladder incontinence, no weakness.  He does not take any pain medication on a regular basis.  He exercise on a regular basis now doing stationary bike, weightlifting.    Pain intervention history:   He is status post bilateral L3/4 transforaminal epidural steroid  injections on 12/08/2021 with 85% relief.    He is status post bilateral L3/4 transforaminal epidural steroid injections on 03/15/2022 with at least 50% relief.  He is status post L5/S1 interlaminar epidural steroid injection on 10/05/2022 with almost 100% relief.      Spine surgeries:  He has seen Dr. Claude Owusu who suggested that he continue exercising, no surgery needed.    Antineuropathics: Gabapentin 100mg qhs  NSAIDs: Mobic 15  Physical therapy:  He had gone to [facility] physical therapy, also chiropractic care over the last several months.  Antidepressants:  Muscle relaxers:  Opioids:  Antiplatelets/Anticoagulants:    ROS:  He reports back pain, joint stiffness.  Balance of review of systems is negative.    No results found for: LABA1C, HGBA1C    Lab Results   Component Value Date    WBC 10.74 05/20/2022    HGB 12.5 (L) 05/20/2022    HCT 36.6 (L) 05/20/2022    MCV 92 05/20/2022     (L) 05/20/2022             Past Medical History:   Diagnosis Date    Arthritis     Cancer     prostate cancer    Cataract     Detached retina, bilateral     General anesthetics causing adverse effect in therapeutic use     GERD (gastroesophageal reflux disease)     Glaucoma     Restless leg syndrome        Past Surgical History:   Procedure Laterality Date    CARPAL TUNNEL RELEASE Left     CATARACT EXTRACTION Bilateral     EPIDURAL STEROID INJECTION INTO LUMBAR SPINE N/A 10/5/2022    Procedure: Injection-steroid-epidural-lumbar L5/S1;  Surgeon: Pedro Elizalde MD;  Location: [facility] OR;  Service: Pain Management;  Laterality: N/A;    EYE SURGERY      FUSION OF POSTERIOR COLUMN OF CERVICAL SPINE USING COMPUTER AIDED NAVIGATION N/A 5/17/2022    Procedure: FUSION, SPINE, POSTERIOR SPINAL COLUMN, CERVICAL, USING COMPUTER-ASSISTED NAVIGATION C3-4 C4-5 C5-6 C6-7 C7-T1;  Surgeon: Abram Ellison MD;  Location: [facility] OR;  Service: Neurosurgery;  Laterality: N/A;    NASAL SEPTUM SURGERY  07/05/2017    POSTERIOR CERVICAL LAMINECTOMY N/A  "5/17/2022    Procedure: LAMINECTOMY, SPINE, CERVICAL, POSTERIOR APPROACH C3-T1;  Surgeon: Abram Ellison MD;  Location: [facility] OR;  Service: Neurosurgery;  Laterality: N/A;    SHOULDER ARTHROSCOPY Right 03/16/2018    RCR    TRANSFORAMINAL EPIDURAL INJECTION OF STEROID Bilateral 12/8/2021    Procedure: Injection,steroid,epidural,transforaminal approach L3/4;  Surgeon: Pedro Elizalde MD;  Location: [facility] OR;  Service: Pain Management;  Laterality: Bilateral;    TRANSFORAMINAL EPIDURAL INJECTION OF STEROID Bilateral 3/15/2022    Procedure: Injection,steroid,epidural,transforaminal approach L3/4;  Surgeon: Pedro Elizalde MD;  Location: [facility] OR;  Service: Pain Management;  Laterality: Bilateral;    VARICOCELECTOMY         Social History     Socioeconomic History    Marital status:    Tobacco Use    Smoking status: Never    Smokeless tobacco: Never   Substance and Sexual Activity    Alcohol use: Yes     Alcohol/week: 4.0 standard drinks     Types: 4 Glasses of wine per week    Drug use: No         Medications/Allergies: See med card    Vitals:    10/28/22 1341   BP: (!) 148/72   Pulse: 66   SpO2: 97%   Weight: 80.8 kg (178 lb 2.1 oz)   Height: 5' 10" (1.778 m)   PainSc: 0-No pain         Physical exam:  Gen: A and O x3, pleasant, well-groomed  Skin: No rashes or obvious lesions  HEENT: PERRLA, no obvious deformities on ears or in canals.Trachea midline.  CVS: Regular rate and rhythm, normal palpable pulses.  Resp: Clear to auscultation bilaterally, no wheezes or rales.  Abdomen: Soft, NT/ND.  Musculoskeletal: Able to heel walk, toe walk. No antalgic gait.   Coordination   Romberg: negative  Finger to nose coordination: normal  Heel to shin coordination: somewhat off balance but able to perform  Tandem walking coordination: normal    Neuro:  Motor:    Right Left   C4 Shoulder Abduction  5  5   C5 Elbow Flexion    5  5   C6 Wrist Extension  5  5   C7 Elbow Extension   5  5   C8/T1 Hand Intrinsics   5  5   C8 " First Dorsal Interosseus  5  5   C8 Abductor Pollicus Brevis  5  5       Iliopsoas Quadriceps Knee  Flexion Tibialis  anterior Gastro- cnemius EHL   Lower: R 5/5 5/5 5/5 5/5 5/5 5/5    L 5/5 5/5 5/5 5/5 5/5 5/5        Left  Right    Triceps DTR 2+ 1+   Biceps DTR 2+ 1+   Brachioradialis DTR 2+ 1+   Patellar DTR 1+ 1+   Achilles DTR 0+ 0+   Hays Absent  Absent   Clonus Absent Absent   Babinski Absent Absent     Sensory: Intact and symmetrical to light touch and pinprick in C2-T1 dermatomes bilaterally. Intact and symmetrical to light touch and pinprick in L1-S1 dermatomes bilaterally.    Lumbar spine:  Lumbar spine: ROM is full with flexion extension and oblique extension with no increased pain.    Sammy's test causes no increased pain on either side.    Supine straight leg raise is negative bilaterally.    Internal and external rotation of the hip causes no increased pain on either side.  Myofascial exam: No tenderness to palpation across lumbar paraspinous muscles.      Imaging:  MRI lumbar spine 05/10/2019:  This is outside imaging, I reviewed this personally.  It appears that he has an S1/2 rudimentary disc.  At L5/S1 there is slight disc height loss and slight anterolisthesis, mild bilateral foraminal narrowing, no canal narrowing, there is facet arthropathy present.  At L4/5 there is a grade 1 anterolisthesis with uncovering of the disc and facet hypertrophy causing mild to moderate canal stenosis.  There is also severe disc height loss at this level.  At L3/4 there is disc height loss and facet hypertrophy, mild canal stenosis and bilateral foraminal stenosis.    03/24/2022 MRI cervical spine paradigm report  There is reversal the normal cervical lordosis with curvature to the left.  There is disc desiccation throughout the spine with disc space narrowing and anterior osteophytic spurring from C2-3 through C7-T1.  There are endplate Modic changes from C2-3 through C6-7.  Partial interbody fusion suggested  at C4-5.  C2-3 central canal 8.3 millimeters, facet hypertrophic changes, moderate to severe right and severe left foraminal stenosis.  C3-4 there is a 3.2 millimeter disc protrusion with uncovertebral spurring with she contacts and displaces the cord, central canal 3.9 millimeters, facet hypertrophic changes, severe right and moderate to severe left foraminal stenosis.  C4-5 1 millimeter of uncovertebral spurring which contacts the cord, central canal measures 7.4 millimeters, facet hypertrophic changes, moderate to severe right and moderate left foraminal stenosis.  C5-6 there is a 3.1 millimeter disc protrusion with uncovertebral spurring which effaces the cord, central canal 7.3 millimeters, facet hypertrophic changes, severe bilateral foraminal stenosis.  C6-7 there is a 3.4 millimeter disc protrusion with uncovertebral spurring which contacts the cord, central canal 7.1 millimeters, facet hypertrophic changes, severe bilateral foraminal stenosis.  C7-T1 there is a 1.1 millimeter disc bulge which effaces the dural sac, facet hypertrophic changes, severe bilateral foraminal stenosis.    09/07/2022 CT lumbar spine   There are 5 non rib-bearing lumbar vertebrae.  The S1 segment is partially lumbarized.  Considering the above nomenclature there is a approximately 6 mm anterolisthesis of L4 on L5.  The alignment of the rest of the vertebral bodies is within normal limits.  Vertebral body heights are within normal limits.  No fractures are noted.  Multilevel degenerative disc disease throughout the lumbar spine.  Paraspinal soft tissues are unremarkable.  There is calcific atherosclerosis of the abdominal aorta and the iliac arteries.  There are multiple right renal cysts and suggestion of a peripelvic left renal cyst the right lower pole cyst measures at least 5.2 cm.  There is a calcific rim along the posterior wall of the cyst.  There is also a speck of calcification in the left renal pelvis, suspicious for a  nonobstructing left renal calculus.  If further evaluation is necessary a dedicated ultrasound or CT scan of the kidneys is suggested.     L5-S1: Spondylotic changes associated with mild disc space narrowing.  Circumferential annular disc bulge right greater than left.  Bilateral mild foraminal stenosis.  There is facet arthropathy.  No significant central canal spinal stenosis.     L4-5: Approximately 6 mm anterolisthesis of L4 on L5 associated with disc degeneration and vacuum phenomena and marginal anterior and posterior osteophytes.  There is a broad-based posterior osteophyte and disc complex the with compression of the thecal sac.  Degenerative hypertrophic facet arthropathy and hypertrophy ligamentum flava results in a severe central canal spinal stenosis (image 38 series 2).  At least moderate bilateral foraminal stenosis.     L3-4: Disc degeneration with disc space narrowing and a circumferential annular disc bulge.  Hypertrophic changes of the facet joints and ligamentum flava results in a severe central canal spinal stenosis (image 30 series 2).  There is also bilateral foraminal stenosis.     L2-3: There is a 1-2 mm retrolisthesis of L2 on L3.  Disc degeneration associated with vacuum phenomena and a broad-based posterior osteophyte/disc complex.  At least moderate central canal spinal stenosis.  There is facet arthropathy.  Bilateral foraminal stenosis.     L1-2: Spondylosis associated with marginal anterior spondylotic osteophytes.  Mild posterior bulging of the annulus.  No significant central canal spinal stenosis.     T12-L1: Spondylosis associated with marginal anterior bridging osteophytes.  No significant disc herniations or central canal spinal stenosis or significant foraminal stenotic disease.    Assessment:  The patient is an 82-year-old man with a history of glaucoma, cataracts presents in referral from Dr. Eh Love for back pain.      1. Lumbar radiculopathy        2. DDD (degenerative  disc disease), lumbar        3. DDD (degenerative disc disease), cervical            Plan:  1. The patient had almost complete relief following the lumbar epidural steroid injection.  This can be repeated in the future if necessary.  2. Follow-up as needed.

## 2022-11-18 ENCOUNTER — HOSPITAL ENCOUNTER (OUTPATIENT)
Dept: RADIOLOGY | Facility: HOSPITAL | Age: 82
Discharge: HOME | End: 2022-11-18
Attending: PHYSICIAN ASSISTANT
Payer: MEDICARE

## 2022-11-18 ENCOUNTER — OFFICE VISIT (OUTPATIENT)
Dept: NEUROSURGERY | Facility: CLINIC | Age: 82
End: 2022-11-18
Payer: MEDICARE

## 2022-11-18 VITALS
WEIGHT: 178 LBS | HEART RATE: 57 BPM | DIASTOLIC BLOOD PRESSURE: 83 MMHG | HEIGHT: 70 IN | SYSTOLIC BLOOD PRESSURE: 157 MMHG | BODY MASS INDEX: 25.48 KG/M2 | RESPIRATION RATE: 18 BRPM

## 2022-11-18 DIAGNOSIS — Z98.1 S/P CERVICAL SPINAL FUSION: ICD-10-CM

## 2022-11-18 DIAGNOSIS — Z98.1 S/P CERVICAL SPINAL FUSION: Primary | ICD-10-CM

## 2022-11-18 PROCEDURE — 72040 XR CERVICAL SPINE AP LATERAL: ICD-10-PCS | Mod: 26,,, | Performed by: RADIOLOGY

## 2022-11-18 PROCEDURE — 72040 X-RAY EXAM NECK SPINE 2-3 VW: CPT | Mod: 26,,, | Performed by: RADIOLOGY

## 2022-11-18 PROCEDURE — 99213 PR OFFICE/OUTPT VISIT, EST, LEVL III, 20-29 MIN: ICD-10-PCS | Mod: S$PBB,,, | Performed by: PHYSICIAN ASSISTANT

## 2022-11-18 PROCEDURE — 99213 OFFICE O/P EST LOW 20 MIN: CPT | Mod: S$PBB,,, | Performed by: PHYSICIAN ASSISTANT

## 2022-11-18 PROCEDURE — 72040 X-RAY EXAM NECK SPINE 2-3 VW: CPT | Mod: TC,FY,PO

## 2022-11-18 NOTE — PROGRESS NOTES
[place] - Neurosurgery - [facility]  Clinic Progress Note         PCP: Eh Love MD    SUBJECTIVE:     Chief Complaint:   Chief Complaint   Patient presents with    Follow-up     Patient presents to clinic today in regards to a 6 month follow up. Surgery 5/17/22. Patient experiencing weakness in Lt arm (tricep) Also present w/tingling in fingers.        History of Present Illness:  Juan Terrazas is a 82 y.o. male who presents for 6 month follow up. He is status post C3-6 brandyn, C3-T1 fusion. He is doing well. He completed PT and now goes to the gym. He did have LUE pain and weakness following surgery. The pain has resolved. He continues to have numbness to the left fingers, but is playing piano and guitar without difficulty. The triceps weakness has significant improved. He is able to do 15 pushups and is isolating that muscle during strength training.     Pertinent and recent history, provider evaluations, imaging and data reviewed in EPIC        Past Medical History:   Diagnosis Date    Arthritis     Cancer     prostate cancer    Cataract     Detached retina, bilateral     General anesthetics causing adverse effect in therapeutic use     GERD (gastroesophageal reflux disease)     Glaucoma     Restless leg syndrome      Past Surgical History:   Procedure Laterality Date    CARPAL TUNNEL RELEASE Left     CATARACT EXTRACTION Bilateral     EPIDURAL STEROID INJECTION INTO LUMBAR SPINE N/A 10/5/2022    Procedure: Injection-steroid-epidural-lumbar L5/S1;  Surgeon: Pedro Elizalde MD;  Location: [facility] OR;  Service: Pain Management;  Laterality: N/A;    EYE SURGERY      FUSION OF POSTERIOR COLUMN OF CERVICAL SPINE USING COMPUTER AIDED NAVIGATION N/A 5/17/2022    Procedure: FUSION, SPINE, POSTERIOR SPINAL COLUMN, CERVICAL, USING COMPUTER-ASSISTED NAVIGATION C3-4 C4-5 C5-6 C6-7 C7-T1;  Surgeon: Abram Ellison MD;  Location: [facility] OR;  Service: Neurosurgery;  Laterality: N/A;    NASAL SEPTUM  SURGERY  07/05/2017    POSTERIOR CERVICAL LAMINECTOMY N/A 5/17/2022    Procedure: LAMINECTOMY, SPINE, CERVICAL, POSTERIOR APPROACH C3-T1;  Surgeon: Abram Ellison MD;  Location: [facility] OR;  Service: Neurosurgery;  Laterality: N/A;    SHOULDER ARTHROSCOPY Right 03/16/2018    RCR    TRANSFORAMINAL EPIDURAL INJECTION OF STEROID Bilateral 12/8/2021    Procedure: Injection,steroid,epidural,transforaminal approach L3/4;  Surgeon: Pedro Elizalde MD;  Location: [facility] OR;  Service: Pain Management;  Laterality: Bilateral;    TRANSFORAMINAL EPIDURAL INJECTION OF STEROID Bilateral 3/15/2022    Procedure: Injection,steroid,epidural,transforaminal approach L3/4;  Surgeon: Pedro Elizalde MD;  Location: [facility] OR;  Service: Pain Management;  Laterality: Bilateral;    VARICOCELECTOMY       Family History   Problem Relation Age of Onset    Heart disease Father      Social History     Tobacco Use    Smoking status: Never    Smokeless tobacco: Never   Substance Use Topics    Alcohol use: Yes     Alcohol/week: 4.0 standard drinks     Types: 4 Glasses of wine per week    Drug use: No      Review of patient's allergies indicates:   Allergen Reactions    Tetanus antitoxin      This was as a child, uses the newer tetanus vaccine.       Current Outpatient Medications:     latanoprost 0.005 % ophthalmic solution, PLACE 1 DROP OU QHS, Disp: , Rfl: 1    pramipexole (MIRAPEX) 1 MG tablet, Take 1.5 mg by mouth every evening., Disp: , Rfl:     tamsulosin (FLOMAX) 0.4 mg Cap, Take 1 capsule by mouth every morning., Disp: , Rfl:     testosterone cypionate (DEPOTESTOTERONE CYPIONATE) 200 mg/mL injection, Inject 200 mg into the muscle every 14 (fourteen) days., Disp: , Rfl:     timolol maleate 0.5% (TIMOPTIC) 0.5 % Drop, Place 1 drop into both eyes 2 (two) times daily., Disp: , Rfl:     vitamins  A,C,E-zinc-copper 14,320-226-200 unit-mg-unit Cap, Take 1 tablet by mouth once daily., Disp: , Rfl:     HYDROcodone-acetaminophen (NORCO) 7.5-325 mg  "per tablet, Take 1 tablet by mouth every 6 (six) hours as needed., Disp: , Rfl:     Review of Systems:   Constitutional: no fever, chills or night sweats. No changes in weight   Eyes: no visual changes   Respiratory: no cough or shortness of breath   Cardiovascular: no chest pain or palpitations   Musculoskeletal: no arthralgias or myalgias  Neurological: no seizures or tremors +weakness, numbness        OBJECTIVE:     Vital Signs (Most Recent):  Pulse: (!) 57 (11/18/22 1131)  Resp: 18 (11/18/22 1131)  BP: (!) 157/83 (11/18/22 1131)  Estimated body mass index is 25.54 kg/m² as calculated from the following:    Height as of this encounter: 5' 10" (1.778 m).    Weight as of this encounter: 80.7 kg (178 lb).    Physical Exam:   General: well developed, well nourished, no distress   Neurologic: Alert and oriented. Thought content appropriate. GCS 15.   Language: No aphasia  Speech: No dysarthria  Head: normocephalic, atraumatic  Eyes: EOMI  Neck: trachea midline, no JVD   Cardiovascular: no LE edema  Pulmonary: normal respirations, no signs of respiratory distress  Abdomen: non-distended  Sensory: intact to light touch throughout  Skin: Skin is warm, dry and intact    Motor Strength: Moves all extremities spontaneously with good tone. No abnormal movements seen.       Deltoids Triceps Biceps Wrist Extension Wrist Flexion Hand  Interossei   Upper: R 5/5 5/5 5/5 5/5 5/5 5/5 5/5    L 5/5 5-/5 5/5 5/5 5/5 5/5 5/5       Gait: normal    Cervical Spine: decreased  ROM        Diagnostic Results:  I have independently reviewed the following imaging:  XR cervical spine with good alignment, stable hardware. No evidence of failure     ASSESSMENT/PLAN:     S/P cervical spinal fusion      Juan Terrazas is a 82 y.o. male 6 months status post C3-6 laminectomy, C3-T1 posterior instrumentation and fusion. He is doing well. Mild residual numbness in left hand, but able to play piano and guitar. Weakness to left triceps improving. He " will follow up as needed.     Patient verbalized understanding of plan. Encouraged to call with any questions or concerns.

## 2023-05-23 ENCOUNTER — PATIENT MESSAGE (OUTPATIENT)
Dept: RESEARCH | Facility: HOSPITAL | Age: 83
End: 2023-05-23
Payer: MEDICARE

## 2024-05-20 PROBLEM — I70.0 AORTIC ATHEROSCLEROSIS: Status: ACTIVE | Noted: 2024-05-20

## 2024-05-20 PROBLEM — G99.2 STENOSIS OF CERVICAL SPINE WITH MYELOPATHY: Status: ACTIVE | Noted: 2022-05-18

## 2024-08-28 PROBLEM — C61 PROSTATE CANCER MANAGED WITH ACTIVE SURVEILLANCE: Status: ACTIVE | Noted: 2024-08-28

## 2024-12-19 PROBLEM — E86.0 DEHYDRATION: Status: ACTIVE | Noted: 2024-12-19

## 2024-12-19 PROBLEM — R79.89 ELEVATED TROPONIN: Status: ACTIVE | Noted: 2024-12-19

## 2024-12-19 PROBLEM — Z71.89 ACP (ADVANCE CARE PLANNING): Status: ACTIVE | Noted: 2024-12-19

## 2024-12-19 PROBLEM — R29.6 FREQUENT FALLS: Status: ACTIVE | Noted: 2024-12-19

## 2024-12-19 PROBLEM — G93.41 ACUTE METABOLIC ENCEPHALOPATHY: Status: ACTIVE | Noted: 2024-12-19

## 2024-12-19 PROBLEM — J10.1 INFLUENZA A: Status: ACTIVE | Noted: 2024-12-19

## 2024-12-19 PROBLEM — I5A MYOCARDIAL INJURY: Status: ACTIVE | Noted: 2024-12-19

## 2024-12-19 PROBLEM — R74.8 ELEVATED CK: Status: ACTIVE | Noted: 2024-12-19

## 2024-12-21 PROBLEM — Z75.8 DISCHARGE PLANNING ISSUES: Status: ACTIVE | Noted: 2024-12-21

## 2025-02-10 ENCOUNTER — TELEPHONE (OUTPATIENT)
Dept: PAIN MEDICINE | Facility: CLINIC | Age: 85
End: 2025-02-10
Payer: MEDICARE

## 2025-03-06 ENCOUNTER — OFFICE VISIT (OUTPATIENT)
Dept: PAIN MEDICINE | Facility: CLINIC | Age: 85
End: 2025-03-06
Payer: MEDICARE

## 2025-03-06 ENCOUNTER — TELEPHONE (OUTPATIENT)
Dept: PAIN MEDICINE | Facility: CLINIC | Age: 85
End: 2025-03-06
Payer: MEDICARE

## 2025-03-06 VITALS
DIASTOLIC BLOOD PRESSURE: 83 MMHG | SYSTOLIC BLOOD PRESSURE: 126 MMHG | WEIGHT: 169.56 LBS | BODY MASS INDEX: 23.65 KG/M2 | HEART RATE: 69 BPM

## 2025-03-06 DIAGNOSIS — M54.16 LUMBAR RADICULOPATHY: Primary | ICD-10-CM

## 2025-03-06 DIAGNOSIS — M47.816 LUMBAR SPONDYLOSIS: ICD-10-CM

## 2025-03-06 PROCEDURE — 99999 PR PBB SHADOW E&M-EST. PATIENT-LVL III: CPT | Mod: PBBFAC,,, | Performed by: PHYSICIAN ASSISTANT

## 2025-03-06 PROCEDURE — 99213 OFFICE O/P EST LOW 20 MIN: CPT | Mod: PBBFAC,PO | Performed by: PHYSICIAN ASSISTANT

## 2025-03-06 PROCEDURE — 99214 OFFICE O/P EST MOD 30 MIN: CPT | Mod: S$PBB,,, | Performed by: PHYSICIAN ASSISTANT

## 2025-03-06 NOTE — TELEPHONE ENCOUNTER
Physician - Dr Elizalde    Type of Procedure/Injection - Lumbar Epidural  L5/S1           Laterality - NA      Priority - Normal      Anxiolysis- Local      Need to hold medication - No      N/A    None      Clearance needed - No      Follow up - 4 week

## 2025-03-07 ENCOUNTER — TELEPHONE (OUTPATIENT)
Dept: PAIN MEDICINE | Facility: CLINIC | Age: 85
End: 2025-03-07
Payer: MEDICARE

## 2025-03-07 DIAGNOSIS — M54.16 LUMBAR RADICULITIS: ICD-10-CM

## 2025-03-07 DIAGNOSIS — M54.16 LUMBAR RADICULOPATHY: Primary | ICD-10-CM

## 2025-03-07 NOTE — TELEPHONE ENCOUNTER
Spoke with pt he accepted the date 3/20 for his procedure with Dr. Elizalde. Pt was informed that he has no medications to hold, no clearance needed pt was informed that he will have a 4 wk in clinic f/u and that he will receive a phone call 1-2 dys prior to his procedure for arrival time and other instructions.

## 2025-03-09 NOTE — PROGRESS NOTES
This note was completed with dictation software and grammatical errors may exist.    CC:  Back pain    HPI:  The patient is an 84-year-old man with a history of glaucoma, cataracts presents in referral from Dr. Eh Love for back pain.  He returns in follow-up today with bilateral low back pain radiating to the left posterior thigh down to his knee as well as into his left testicle.  He had > 2 years of relief with his last injection.  His current pain has been present for the last month and is progressively getting worse.  He continues to exercise regularly, however has not been able to go as frequently to the gym because of the pain.  He denies weakness or numbness.    Prior HPI: The patient reports that he has been active for many years, was of runner even up until his 70s.  He states that 5 years ago he was running and stepped into a hole and felt sudden right hip pain, felt like his hip was shifted to the right.  He states that ever since this time, he has had back pain.  The back pain is located primarily across the bilateral low back and radiates out to the right lateral buttock and hip at times.  He used to have pain radiating along his hamstrings but no longer has this.  He denies any pain radiating into his lower legs, no numbness or weakness.  He states that he feels stiff when he wakes up in the morning but is able to exercise and do activities throughout the day without issue.  However, his pain can come randomly such as when bending over he can feel a sudden sharp pain.  If he has pain, he can do stretches that can often relieve this.  It seems like his pain is mostly worse with extension.  He denies any bowel or bladder incontinence, no weakness.  He does not take any pain medication on a regular basis.  He exercise on a regular basis now doing stationary bike, weightlifting.    Pain intervention history:   He is status post bilateral L3/4 transforaminal epidural steroid injections on 12/08/2021  with 85% relief.    He is status post bilateral L3/4 transforaminal epidural steroid injections on 03/15/2022 with at least 50% relief.  He is status post L5/S1 interlaminar epidural steroid injection on 10/05/2022 with almost 100% relief.      Spine surgeries:  He has seen Dr. Claude Owusu who suggested that he continue exercising, no surgery needed.    Antineuropathics: Gabapentin 100mg qhs  NSAIDs: Mobic 15  Physical therapy:  He had gone to [facility] physical therapy, also chiropractic care over the last several months.  Antidepressants:  Muscle relaxers:  Opioids:  Antiplatelets/Anticoagulants:    ROS:  He reports back pain, joint stiffness.  Balance of review of systems is negative.    Lab Results   Component Value Date    HGBA1C 5.0 12/19/2024       Lab Results   Component Value Date    WBC 12.53 12/20/2024    HGB 13.9 (L) 12/20/2024    HCT 41.3 12/20/2024    MCV 96 12/20/2024     (L) 12/20/2024             Past Medical History:   Diagnosis Date    Anemia     Arthritis     Cataract     Detached retina, bilateral     Dyslipidemia     Elevated prostate specific antigen (PSA)     General anesthetics causing adverse effect in therapeutic use     slow to arouse after shoulder surgery    GERD (gastroesophageal reflux disease)     Glaucoma     Prostate cancer     active surveillance    Restless leg syndrome        Past Surgical History:   Procedure Laterality Date    CARPAL TUNNEL RELEASE Left     CATARACT EXTRACTION Bilateral     EPIDURAL STEROID INJECTION INTO LUMBAR SPINE N/A 10/5/2022    Procedure: Injection-steroid-epidural-lumbar L5/S1;  Surgeon: Pedro Elizalde MD;  Location: [facility];  Service: Pain Management;  Laterality: N/A;    EYE SURGERY      FUSION OF POSTERIOR COLUMN OF CERVICAL SPINE USING COMPUTER AIDED NAVIGATION N/A 5/17/2022    Procedure: FUSION, SPINE, POSTERIOR SPINAL COLUMN, CERVICAL, USING COMPUTER-ASSISTED NAVIGATION C3-4 C4-5 C5-6 C6-7 C7-T1;  Surgeon: Abram Ellison MD;  Location:  [facility] OR;  Service: Neurosurgery;  Laterality: N/A;    NASAL SEPTUM SURGERY  07/05/2017    POSTERIOR CERVICAL LAMINECTOMY N/A 5/17/2022    Procedure: LAMINECTOMY, SPINE, CERVICAL, POSTERIOR APPROACH C3-T1;  Surgeon: Abram Ellison MD;  Location: [facility];  Service: Neurosurgery;  Laterality: N/A;    SHOULDER ARTHROSCOPY Right 03/16/2018    RCR    TRANSFORAMINAL EPIDURAL INJECTION OF STEROID Bilateral 12/8/2021    Procedure: Injection,steroid,epidural,transforaminal approach L3/4;  Surgeon: Pedro Elizalde MD;  Location: [facility];  Service: Pain Management;  Laterality: Bilateral;    TRANSFORAMINAL EPIDURAL INJECTION OF STEROID Bilateral 3/15/2022    Procedure: Injection,steroid,epidural,transforaminal approach L3/4;  Surgeon: Pedro Elizalde MD;  Location: [facility] OR;  Service: Pain Management;  Laterality: Bilateral;    TRANSRECTAL BIOPSY OF PROSTATE WITH ULTRASOUND GUIDANCE N/A 8/28/2024    Procedure: BIOPSY, PROSTATE, RECTAL APPROACH, WITH US GUIDANCE;  Surgeon: Juaquin Mar MD;  Location: [facility];  Service: Urology;  Laterality: N/A;    VARICOCELECTOMY         Social History     Socioeconomic History    Marital status:    Tobacco Use    Smoking status: Never    Smokeless tobacco: Never   Substance and Sexual Activity    Alcohol use: Not Currently     Comment: occasionally    Drug use: No     Social Drivers of Health     Financial Resource Strain: Low Risk  (12/20/2024)    Overall Financial Resource Strain (CARDIA)     Difficulty of Paying Living Expenses: Not hard at all   Food Insecurity: No Food Insecurity (12/20/2024)    Hunger Vital Sign     Worried About Running Out of Food in the Last Year: Never true     Ran Out of Food in the Last Year: Never true   Transportation Needs: No Transportation Needs (12/20/2024)    TRANSPORTATION NEEDS     Transportation : No   Physical Activity: Sufficiently Active (11/18/2024)    Exercise Vital Sign     Days of Exercise per Week: 4 days     Minutes of  Exercise per Session: 90 min   Stress: No Stress Concern Present (12/20/2024)    [employer] - Occupational Stress Questionnaire     Feeling of Stress : Not at all   Recent Concern: Stress - Stress Concern Present (11/18/2024)    [employer] - Occupational Stress Questionnaire     Feeling of Stress : Very much   Housing Stability: Unknown (12/20/2024)    Housing Stability Vital Sign     Unable to Pay for Housing in the Last Year: No     Homeless in the Last Year: No         Medications/Allergies: See med card    Vitals:    03/06/25 1353   BP: 126/83   Pulse: 69   Weight: 76.9 kg (169 lb 8.5 oz)   PainSc:   9   PainLoc: Back         Physical exam:  Gen: A and O x3, pleasant, well-groomed  Skin: No rashes or obvious lesions  HEENT: PERRLA, no obvious deformities on ears or in canals.Trachea midline.  CVS: Regular rate and rhythm, normal palpable pulses.  Resp: Clear to auscultation bilaterally, no wheezes or rales.  Abdomen: Soft, NT/ND.  Musculoskeletal: Able to heel walk, toe walk. No antalgic gait.   Coordination   Romberg: negative  Finger to nose coordination: normal  Heel to shin coordination: somewhat off balance but able to perform  Tandem walking coordination: normal    Neuro:  Motor:    Right Left   C4 Shoulder Abduction  5  5   C5 Elbow Flexion    5  5   C6 Wrist Extension  5  5   C7 Elbow Extension   5  5   C8/T1 Hand Intrinsics   5  5   C8 First Dorsal Interosseus  5  5   C8 Abductor Pollicus Brevis  5  5       Iliopsoas Quadriceps Knee  Flexion Tibialis  anterior Gastro- cnemius EHL   Lower: R 5/5 5/5 5/5 5/5 5/5 5/5    L 5/5 5/5 5/5 5/5 5/5 5/5        Left  Right    Triceps DTR 2+ 1+   Biceps DTR 2+ 1+   Brachioradialis DTR 2+ 1+   Patellar DTR 1+ 1+   Achilles DTR 0+ 0+   Hays Absent  Absent   Clonus Absent Absent   Babinski Absent Absent     Sensory: Intact and symmetrical to light touch and pinprick in C2-T1 dermatomes bilaterally. Intact  and symmetrical to light touch and pinprick in L1-S1 dermatomes bilaterally.    Lumbar spine:  Lumbar spine: ROM is full with flexion extension and oblique extension with increased low back pain during each maneuver.  Sammy's test causes no increased pain on either side.    Supine straight leg raise causes left leg pain.  Internal and external rotation of the hip causes no increased pain on either side.  Myofascial exam: No tenderness to palpation across lumbar paraspinous muscles.      Imaging:  MRI lumbar spine 05/10/2019:  This is outside imaging, I reviewed this personally.  It appears that he has an S1/2 rudimentary disc.  At L5/S1 there is slight disc height loss and slight anterolisthesis, mild bilateral foraminal narrowing, no canal narrowing, there is facet arthropathy present.  At L4/5 there is a grade 1 anterolisthesis with uncovering of the disc and facet hypertrophy causing mild to moderate canal stenosis.  There is also severe disc height loss at this level.  At L3/4 there is disc height loss and facet hypertrophy, mild canal stenosis and bilateral foraminal stenosis.    03/24/2022 MRI cervical spine paradigm report  There is reversal the normal cervical lordosis with curvature to the left.  There is disc desiccation throughout the spine with disc space narrowing and anterior osteophytic spurring from C2-3 through C7-T1.  There are endplate Modic changes from C2-3 through C6-7.  Partial interbody fusion suggested at C4-5.  C2-3 central canal 8.3 millimeters, facet hypertrophic changes, moderate to severe right and severe left foraminal stenosis.  C3-4 there is a 3.2 millimeter disc protrusion with uncovertebral spurring with she contacts and displaces the cord, central canal 3.9 millimeters, facet hypertrophic changes, severe right and moderate to severe left foraminal stenosis.  C4-5 1 millimeter of uncovertebral spurring which contacts the cord, central canal measures 7.4 millimeters, facet  hypertrophic changes, moderate to severe right and moderate left foraminal stenosis.  C5-6 there is a 3.1 millimeter disc protrusion with uncovertebral spurring which effaces the cord, central canal 7.3 millimeters, facet hypertrophic changes, severe bilateral foraminal stenosis.  C6-7 there is a 3.4 millimeter disc protrusion with uncovertebral spurring which contacts the cord, central canal 7.1 millimeters, facet hypertrophic changes, severe bilateral foraminal stenosis.  C7-T1 there is a 1.1 millimeter disc bulge which effaces the dural sac, facet hypertrophic changes, severe bilateral foraminal stenosis.    09/07/2022 CT lumbar spine   There are 5 non rib-bearing lumbar vertebrae.  The S1 segment is partially lumbarized.  Considering the above nomenclature there is a approximately 6 mm anterolisthesis of L4 on L5.  The alignment of the rest of the vertebral bodies is within normal limits.  Vertebral body heights are within normal limits.  No fractures are noted.  Multilevel degenerative disc disease throughout the lumbar spine.  Paraspinal soft tissues are unremarkable.  There is calcific atherosclerosis of the abdominal aorta and the iliac arteries.  There are multiple right renal cysts and suggestion of a peripelvic left renal cyst the right lower pole cyst measures at least 5.2 cm.  There is a calcific rim along the posterior wall of the cyst.  There is also a speck of calcification in the left renal pelvis, suspicious for a nonobstructing left renal calculus.  If further evaluation is necessary a dedicated ultrasound or CT scan of the kidneys is suggested.     L5-S1: Spondylotic changes associated with mild disc space narrowing.  Circumferential annular disc bulge right greater than left.  Bilateral mild foraminal stenosis.  There is facet arthropathy.  No significant central canal spinal stenosis.     L4-5: Approximately 6 mm anterolisthesis of L4 on L5 associated with disc degeneration and vacuum phenomena  and marginal anterior and posterior osteophytes.  There is a broad-based posterior osteophyte and disc complex the with compression of the thecal sac.  Degenerative hypertrophic facet arthropathy and hypertrophy ligamentum flava results in a severe central canal spinal stenosis (image 38 series 2).  At least moderate bilateral foraminal stenosis.     L3-4: Disc degeneration with disc space narrowing and a circumferential annular disc bulge.  Hypertrophic changes of the facet joints and ligamentum flava results in a severe central canal spinal stenosis (image 30 series 2).  There is also bilateral foraminal stenosis.     L2-3: There is a 1-2 mm retrolisthesis of L2 on L3.  Disc degeneration associated with vacuum phenomena and a broad-based posterior osteophyte/disc complex.  At least moderate central canal spinal stenosis.  There is facet arthropathy.  Bilateral foraminal stenosis.     L1-2: Spondylosis associated with marginal anterior spondylotic osteophytes.  Mild posterior bulging of the annulus.  No significant central canal spinal stenosis.     T12-L1: Spondylosis associated with marginal anterior bridging osteophytes.  No significant disc herniations or central canal spinal stenosis or significant foraminal stenotic disease.    Assessment:  The patient is an 84-year-old man with a history of glaucoma, cataracts presents in referral from Dr. Eh Love for back pain.      1. Lumbar radiculopathy        2. Lumbar spondylosis            Plan:  1. I will schedule the patient to repeat an L5/S1 interlaminar epidural steroid injection.  He had greater than 2 years of relief following his last injection.    2. Follow-up in 4 weeks postprocedure or sooner as needed.

## 2025-03-09 NOTE — H&P (VIEW-ONLY)
This note was completed with dictation software and grammatical errors may exist.    CC:  Back pain    HPI:  The patient is an 84-year-old man with a history of glaucoma, cataracts presents in referral from Dr. Eh Love for back pain.  He returns in follow-up today with bilateral low back pain radiating to the left posterior thigh down to his knee as well as into his left testicle.  He had > 2 years of relief with his last injection.  His current pain has been present for the last month and is progressively getting worse.  He continues to exercise regularly, however has not been able to go as frequently to the gym because of the pain.  He denies weakness or numbness.    Prior HPI: The patient reports that he has been active for many years, was of runner even up until his 70s.  He states that 5 years ago he was running and stepped into a hole and felt sudden right hip pain, felt like his hip was shifted to the right.  He states that ever since this time, he has had back pain.  The back pain is located primarily across the bilateral low back and radiates out to the right lateral buttock and hip at times.  He used to have pain radiating along his hamstrings but no longer has this.  He denies any pain radiating into his lower legs, no numbness or weakness.  He states that he feels stiff when he wakes up in the morning but is able to exercise and do activities throughout the day without issue.  However, his pain can come randomly such as when bending over he can feel a sudden sharp pain.  If he has pain, he can do stretches that can often relieve this.  It seems like his pain is mostly worse with extension.  He denies any bowel or bladder incontinence, no weakness.  He does not take any pain medication on a regular basis.  He exercise on a regular basis now doing stationary bike, weightlifting.    Pain intervention history:   He is status post bilateral L3/4 transforaminal epidural steroid injections on 12/08/2021  with 85% relief.    He is status post bilateral L3/4 transforaminal epidural steroid injections on 03/15/2022 with at least 50% relief.  He is status post L5/S1 interlaminar epidural steroid injection on 10/05/2022 with almost 100% relief.      Spine surgeries:  He has seen Dr. Claude Owusu who suggested that he continue exercising, no surgery needed.    Antineuropathics: Gabapentin 100mg qhs  NSAIDs: Mobic 15  Physical therapy:  He had gone to [facility] physical therapy, also chiropractic care over the last several months.  Antidepressants:  Muscle relaxers:  Opioids:  Antiplatelets/Anticoagulants:    ROS:  He reports back pain, joint stiffness.  Balance of review of systems is negative.    Lab Results   Component Value Date    HGBA1C 5.0 12/19/2024       Lab Results   Component Value Date    WBC 12.53 12/20/2024    HGB 13.9 (L) 12/20/2024    HCT 41.3 12/20/2024    MCV 96 12/20/2024     (L) 12/20/2024             Past Medical History:   Diagnosis Date    Anemia     Arthritis     Cataract     Detached retina, bilateral     Dyslipidemia     Elevated prostate specific antigen (PSA)     General anesthetics causing adverse effect in therapeutic use     slow to arouse after shoulder surgery    GERD (gastroesophageal reflux disease)     Glaucoma     Prostate cancer     active surveillance    Restless leg syndrome        Past Surgical History:   Procedure Laterality Date    CARPAL TUNNEL RELEASE Left     CATARACT EXTRACTION Bilateral     EPIDURAL STEROID INJECTION INTO LUMBAR SPINE N/A 10/5/2022    Procedure: Injection-steroid-epidural-lumbar L5/S1;  Surgeon: Pedro Elizalde MD;  Location: [facility];  Service: Pain Management;  Laterality: N/A;    EYE SURGERY      FUSION OF POSTERIOR COLUMN OF CERVICAL SPINE USING COMPUTER AIDED NAVIGATION N/A 5/17/2022    Procedure: FUSION, SPINE, POSTERIOR SPINAL COLUMN, CERVICAL, USING COMPUTER-ASSISTED NAVIGATION C3-4 C4-5 C5-6 C6-7 C7-T1;  Surgeon: Abram Ellison MD;  Location:  [facility] OR;  Service: Neurosurgery;  Laterality: N/A;    NASAL SEPTUM SURGERY  07/05/2017    POSTERIOR CERVICAL LAMINECTOMY N/A 5/17/2022    Procedure: LAMINECTOMY, SPINE, CERVICAL, POSTERIOR APPROACH C3-T1;  Surgeon: Abram Ellison MD;  [facility];  Service: Neurosurgery;  Laterality: N/A;    SHOULDER ARTHROSCOPY Right 03/16/2018    RCR    TRANSFORAMINAL EPIDURAL INJECTION OF STEROID Bilateral 12/8/2021    Procedure: Injection,steroid,epidural,transforaminal approach L3/4;  Surgeon: Pedro Elizalde MD;  Location: [facility];  Service: Pain Management;  Laterality: Bilateral;    TRANSFORAMINAL EPIDURAL INJECTION OF STEROID Bilateral 3/15/2022    Procedure: Injection,steroid,epidural,transforaminal approach L3/4;  Surgeon: Pedro Elizalde MD;  Location: [facility] OR;  Service: Pain Management;  Laterality: Bilateral;    TRANSRECTAL BIOPSY OF PROSTATE WITH ULTRASOUND GUIDANCE N/A 8/28/2024    Procedure: BIOPSY, PROSTATE, RECTAL APPROACH, WITH US GUIDANCE;  Surgeon: Juaquin Mar MD;  Location: [facility];  Service: Urology;  Laterality: N/A;    VARICOCELECTOMY         Social History     Socioeconomic History    Marital status:    Tobacco Use    Smoking status: Never    Smokeless tobacco: Never   Substance and Sexual Activity    Alcohol use: Not Currently     Comment: occasionally    Drug use: No     Social Drivers of Health     Financial Resource Strain: Low Risk  (12/20/2024)    Overall Financial Resource Strain (CARDIA)     Difficulty of Paying Living Expenses: Not hard at all   Food Insecurity: No Food Insecurity (12/20/2024)    Hunger Vital Sign     Worried About Running Out of Food in the Last Year: Never true     Ran Out of Food in the Last Year: Never true   Transportation Needs: No Transportation Needs (12/20/2024)    TRANSPORTATION NEEDS     Transportation : No   Physical Activity: Sufficiently Active (11/18/2024)    Exercise Vital Sign     Days of Exercise per Week: 4 days     Minutes of  Exercise per Session: 90 min   Stress: No Stress Concern Present (12/20/2024)    [employer] - Occupational Stress Questionnaire     Feeling of Stress : Not at all   Recent Concern: Stress - Stress Concern Present (11/18/2024)    [employer] - Occupational Stress Questionnaire     Feeling of Stress : Very much   Housing Stability: Unknown (12/20/2024)    Housing Stability Vital Sign     Unable to Pay for Housing in the Last Year: No     Homeless in the Last Year: No         Medications/Allergies: See med card    Vitals:    03/06/25 1353   BP: 126/83   Pulse: 69   Weight: 76.9 kg (169 lb 8.5 oz)   PainSc:   9   PainLoc: Back         Physical exam:  Gen: A and O x3, pleasant, well-groomed  Skin: No rashes or obvious lesions  HEENT: PERRLA, no obvious deformities on ears or in canals.Trachea midline.  CVS: Regular rate and rhythm, normal palpable pulses.  Resp: Clear to auscultation bilaterally, no wheezes or rales.  Abdomen: Soft, NT/ND.  Musculoskeletal: Able to heel walk, toe walk. No antalgic gait.   Coordination   Romberg: negative  Finger to nose coordination: normal  Heel to shin coordination: somewhat off balance but able to perform  Tandem walking coordination: normal    Neuro:  Motor:    Right Left   C4 Shoulder Abduction  5  5   C5 Elbow Flexion    5  5   C6 Wrist Extension  5  5   C7 Elbow Extension   5  5   C8/T1 Hand Intrinsics   5  5   C8 First Dorsal Interosseus  5  5   C8 Abductor Pollicus Brevis  5  5       Iliopsoas Quadriceps Knee  Flexion Tibialis  anterior Gastro- cnemius EHL   Lower: R 5/5 5/5 5/5 5/5 5/5 5/5    L 5/5 5/5 5/5 5/5 5/5 5/5        Left  Right    Triceps DTR 2+ 1+   Biceps DTR 2+ 1+   Brachioradialis DTR 2+ 1+   Patellar DTR 1+ 1+   Achilles DTR 0+ 0+   Hays Absent  Absent   Clonus Absent Absent   Babinski Absent Absent     Sensory: Intact and symmetrical to light touch and pinprick in C2-T1 dermatomes bilaterally. Intact  and symmetrical to light touch and pinprick in L1-S1 dermatomes bilaterally.    Lumbar spine:  Lumbar spine: ROM is full with flexion extension and oblique extension with increased low back pain during each maneuver.  Sammy's test causes no increased pain on either side.    Supine straight leg raise causes left leg pain.  Internal and external rotation of the hip causes no increased pain on either side.  Myofascial exam: No tenderness to palpation across lumbar paraspinous muscles.      Imaging:  MRI lumbar spine 05/10/2019:  This is outside imaging, I reviewed this personally.  It appears that he has an S1/2 rudimentary disc.  At L5/S1 there is slight disc height loss and slight anterolisthesis, mild bilateral foraminal narrowing, no canal narrowing, there is facet arthropathy present.  At L4/5 there is a grade 1 anterolisthesis with uncovering of the disc and facet hypertrophy causing mild to moderate canal stenosis.  There is also severe disc height loss at this level.  At L3/4 there is disc height loss and facet hypertrophy, mild canal stenosis and bilateral foraminal stenosis.    03/24/2022 MRI cervical spine paradigm report  There is reversal the normal cervical lordosis with curvature to the left.  There is disc desiccation throughout the spine with disc space narrowing and anterior osteophytic spurring from C2-3 through C7-T1.  There are endplate Modic changes from C2-3 through C6-7.  Partial interbody fusion suggested at C4-5.  C2-3 central canal 8.3 millimeters, facet hypertrophic changes, moderate to severe right and severe left foraminal stenosis.  C3-4 there is a 3.2 millimeter disc protrusion with uncovertebral spurring with she contacts and displaces the cord, central canal 3.9 millimeters, facet hypertrophic changes, severe right and moderate to severe left foraminal stenosis.  C4-5 1 millimeter of uncovertebral spurring which contacts the cord, central canal measures 7.4 millimeters, facet  hypertrophic changes, moderate to severe right and moderate left foraminal stenosis.  C5-6 there is a 3.1 millimeter disc protrusion with uncovertebral spurring which effaces the cord, central canal 7.3 millimeters, facet hypertrophic changes, severe bilateral foraminal stenosis.  C6-7 there is a 3.4 millimeter disc protrusion with uncovertebral spurring which contacts the cord, central canal 7.1 millimeters, facet hypertrophic changes, severe bilateral foraminal stenosis.  C7-T1 there is a 1.1 millimeter disc bulge which effaces the dural sac, facet hypertrophic changes, severe bilateral foraminal stenosis.    09/07/2022 CT lumbar spine   There are 5 non rib-bearing lumbar vertebrae.  The S1 segment is partially lumbarized.  Considering the above nomenclature there is a approximately 6 mm anterolisthesis of L4 on L5.  The alignment of the rest of the vertebral bodies is within normal limits.  Vertebral body heights are within normal limits.  No fractures are noted.  Multilevel degenerative disc disease throughout the lumbar spine.  Paraspinal soft tissues are unremarkable.  There is calcific atherosclerosis of the abdominal aorta and the iliac arteries.  There are multiple right renal cysts and suggestion of a peripelvic left renal cyst the right lower pole cyst measures at least 5.2 cm.  There is a calcific rim along the posterior wall of the cyst.  There is also a speck of calcification in the left renal pelvis, suspicious for a nonobstructing left renal calculus.  If further evaluation is necessary a dedicated ultrasound or CT scan of the kidneys is suggested.     L5-S1: Spondylotic changes associated with mild disc space narrowing.  Circumferential annular disc bulge right greater than left.  Bilateral mild foraminal stenosis.  There is facet arthropathy.  No significant central canal spinal stenosis.     L4-5: Approximately 6 mm anterolisthesis of L4 on L5 associated with disc degeneration and vacuum phenomena  and marginal anterior and posterior osteophytes.  There is a broad-based posterior osteophyte and disc complex the with compression of the thecal sac.  Degenerative hypertrophic facet arthropathy and hypertrophy ligamentum flava results in a severe central canal spinal stenosis (image 38 series 2).  At least moderate bilateral foraminal stenosis.     L3-4: Disc degeneration with disc space narrowing and a circumferential annular disc bulge.  Hypertrophic changes of the facet joints and ligamentum flava results in a severe central canal spinal stenosis (image 30 series 2).  There is also bilateral foraminal stenosis.     L2-3: There is a 1-2 mm retrolisthesis of L2 on L3.  Disc degeneration associated with vacuum phenomena and a broad-based posterior osteophyte/disc complex.  At least moderate central canal spinal stenosis.  There is facet arthropathy.  Bilateral foraminal stenosis.     L1-2: Spondylosis associated with marginal anterior spondylotic osteophytes.  Mild posterior bulging of the annulus.  No significant central canal spinal stenosis.     T12-L1: Spondylosis associated with marginal anterior bridging osteophytes.  No significant disc herniations or central canal spinal stenosis or significant foraminal stenotic disease.    Assessment:  The patient is an 84-year-old man with a history of glaucoma, cataracts presents in referral from Dr. Eh Love for back pain.      1. Lumbar radiculopathy        2. Lumbar spondylosis            Plan:  1. I will schedule the patient to repeat an L5/S1 interlaminar epidural steroid injection.  He had greater than 2 years of relief following his last injection.    2. Follow-up in 4 weeks postprocedure or sooner as needed.

## 2025-03-24 ENCOUNTER — HOSPITAL ENCOUNTER (OUTPATIENT)
Facility: HOSPITAL | Age: 85
Discharge: HOME | End: 2025-03-24
Attending: ANESTHESIOLOGY | Admitting: ANESTHESIOLOGY
Payer: MEDICARE

## 2025-03-24 ENCOUNTER — HOSPITAL ENCOUNTER (OUTPATIENT)
Dept: RADIOLOGY | Facility: HOSPITAL | Age: 85
Discharge: HOME | End: 2025-03-24
Attending: ANESTHESIOLOGY
Payer: MEDICARE

## 2025-03-24 DIAGNOSIS — M54.50 LOWER BACK PAIN: ICD-10-CM

## 2025-03-24 DIAGNOSIS — M54.16 LUMBAR RADICULITIS: ICD-10-CM

## 2025-03-24 DIAGNOSIS — M54.16 LUMBAR RADICULOPATHY: ICD-10-CM

## 2025-03-24 PROCEDURE — A4216 STERILE WATER/SALINE, 10 ML: HCPCS | Mod: PO | Performed by: ANESTHESIOLOGY

## 2025-03-24 PROCEDURE — 62323 NJX INTERLAMINAR LMBR/SAC: CPT | Mod: PO | Performed by: ANESTHESIOLOGY

## 2025-03-24 PROCEDURE — 25500020 PHARM REV CODE 255: Mod: PO | Performed by: ANESTHESIOLOGY

## 2025-03-24 PROCEDURE — 63600175 PHARM REV CODE 636 W HCPCS: Mod: JZ,TB,PO | Performed by: ANESTHESIOLOGY

## 2025-03-24 PROCEDURE — 25000003 PHARM REV CODE 250: Mod: PO | Performed by: ANESTHESIOLOGY

## 2025-03-24 PROCEDURE — 62323 NJX INTERLAMINAR LMBR/SAC: CPT | Mod: ,,, | Performed by: ANESTHESIOLOGY

## 2025-03-24 RX ORDER — LIDOCAINE HYDROCHLORIDE 10 MG/ML
INJECTION, SOLUTION EPIDURAL; INFILTRATION; INTRACAUDAL; PERINEURAL
Status: DISCONTINUED | OUTPATIENT
Start: 2025-03-24 | End: 2025-03-24 | Stop reason: HOSPADM

## 2025-03-24 RX ORDER — METHYLPREDNISOLONE ACETATE 80 MG/ML
INJECTION, SUSPENSION INTRA-ARTICULAR; INTRALESIONAL; INTRAMUSCULAR; SOFT TISSUE
Status: DISCONTINUED | OUTPATIENT
Start: 2025-03-24 | End: 2025-03-24 | Stop reason: HOSPADM

## 2025-03-24 RX ORDER — SODIUM CHLORIDE 9 MG/ML
INJECTION, SOLUTION INTRAMUSCULAR; INTRAVENOUS; SUBCUTANEOUS
Status: DISCONTINUED | OUTPATIENT
Start: 2025-03-24 | End: 2025-03-24 | Stop reason: HOSPADM

## 2025-03-24 NOTE — INTERVAL H&P NOTE
The patient has been examined and the H&P has been reviewed:    I concur with the findings and no changes have occurred since H&P was written.    Procedure risks, benefits and alternative options discussed and understood by patient/family.      ASA 2, mallampati 2      There are no hospital problems to display for this patient.     None

## 2025-03-24 NOTE — OP NOTE

## 2025-03-25 VITALS
TEMPERATURE: 98 F | RESPIRATION RATE: 18 BRPM | HEART RATE: 60 BPM | HEIGHT: 71 IN | OXYGEN SATURATION: 96 % | WEIGHT: 169 LBS | DIASTOLIC BLOOD PRESSURE: 72 MMHG | SYSTOLIC BLOOD PRESSURE: 140 MMHG | BODY MASS INDEX: 23.66 KG/M2

## 2025-04-24 ENCOUNTER — OFFICE VISIT (OUTPATIENT)
Dept: PAIN MEDICINE | Facility: CLINIC | Age: 85
End: 2025-04-24
Payer: MEDICARE

## 2025-04-24 VITALS
SYSTOLIC BLOOD PRESSURE: 172 MMHG | HEART RATE: 65 BPM | HEIGHT: 71 IN | DIASTOLIC BLOOD PRESSURE: 90 MMHG | BODY MASS INDEX: 23.95 KG/M2 | WEIGHT: 171.06 LBS

## 2025-04-24 DIAGNOSIS — M54.16 LUMBAR RADICULOPATHY, CHRONIC: Primary | ICD-10-CM

## 2025-04-24 PROCEDURE — 99999 PR PBB SHADOW E&M-EST. PATIENT-LVL III: CPT | Mod: PBBFAC,,, | Performed by: PHYSICIAN ASSISTANT

## 2025-04-24 PROCEDURE — 99213 OFFICE O/P EST LOW 20 MIN: CPT | Mod: PBBFAC,PO | Performed by: PHYSICIAN ASSISTANT

## 2025-04-24 NOTE — PROGRESS NOTES
This note was completed with dictation software and grammatical errors may exist.    CC:  Back pain    HPI:  The patient is an 84-year-old man with a history of glaucoma, cataracts presents in referral from Dr. Eh Love for back pain.    History of Present Illness    Patient reports improvement in his back after receiving an injection, but ongoing severe pain in his glutes and hamstrings. Pain is particularly intense when driving, exacerbated by car seat pressure. After just 15 minutes of driving, he experiences significant discomfort, causing him to elevate himself off the seat to alleviate pressure.    Walking is problematic, especially at a fast pace, and running is not possible. However, he can play golf without issues. Pain occurs when lifting his leg to put on socks or shoes, leading him to adopt a specific method of putting on footwear while standing. Sitting is reported as the worst position for pain.    He describes the pain as intense and initially thought he might have pulled a hamstring. He reports no weakness, though he reduces activities when feeling mild pain. Interestingly, he can perform squats without pain despite his other symptoms.    His last MRI was approximately three years ago. He continues to exercise regularly despite his symptoms.    The injection for back pain provided some relief, but minimal improvement in his glute and hamstring pain.    Prior HPI: The patient reports that he has been active for many years, was of runner even up until his 70s.  He states that 5 years ago he was running and stepped into a hole and felt sudden right hip pain, felt like his hip was shifted to the right.  He states that ever since this time, he has had back pain.  The back pain is located primarily across the bilateral low back and radiates out to the right lateral buttock and hip at times.  He used to have pain radiating along his hamstrings but no longer has this.  He denies any pain radiating into  his lower legs, no numbness or weakness.  He states that he feels stiff when he wakes up in the morning but is able to exercise and do activities throughout the day without issue.  However, his pain can come randomly such as when bending over he can feel a sudden sharp pain.  If he has pain, he can do stretches that can often relieve this.  It seems like his pain is mostly worse with extension.  He denies any bowel or bladder incontinence, no weakness.  He does not take any pain medication on a regular basis.  He exercise on a regular basis now doing stationary bike, weightlifting.    Pain intervention history:   He is status post bilateral L3/4 transforaminal epidural steroid injections on 12/08/2021 with 85% relief.    He is status post bilateral L3/4 transforaminal epidural steroid injections on 03/15/2022 with at least 50% relief.  He is status post L5/S1 interlaminar epidural steroid injection on 10/05/2022 with almost 100% relief.  He is status post L5/S1 interlaminar epidural steroid injection on 03/24/2025 with relief of his back pain but no major relief of his bilateral buttock or posterior thigh pain.    Spine surgeries:  He has seen Dr. Claude Owusu who suggested that he continue exercising, no surgery needed.    Antineuropathics: Gabapentin 100mg qhs  NSAIDs: Mobic 15  Physical therapy:  [facility] had gone [facility], also chiropractic care over the last several months.  Antidepressants:  Muscle relaxers:  Opioids:  Antiplatelets/Anticoagulants:    ROS:  He reports back pain, joint stiffness.  Balance of review of systems is negative.    Lab Results   Component Value Date    HGBA1C 5.0 12/19/2024       Lab Results   Component Value Date    WBC 12.53 12/20/2024    HGB 13.9 (L) 12/20/2024    HCT 41.3 12/20/2024    MCV 96 12/20/2024     (L) 12/20/2024             Past Medical History:   Diagnosis Date    Anemia     Arthritis     Cataract     Detached retina, bilateral     Dyslipidemia      Elevated prostate specific antigen (PSA)     General anesthetics causing adverse effect in therapeutic use     slow to arouse after shoulder surgery    GERD (gastroesophageal reflux disease)     Glaucoma     Prostate cancer     active surveillance    Restless leg syndrome        Past Surgical History:   Procedure Laterality Date    CARPAL TUNNEL RELEASE Left     CATARACT EXTRACTION Bilateral     EPIDURAL STEROID INJECTION INTO LUMBAR SPINE N/A 10/5/2022    Procedure: Injection-steroid-epidural-lumbar L5/S1;  Surgeon: Pedro Elizalde MD;  Location: [facility] OR;  Service: Pain Management;  Laterality: N/A;    EPIDURAL STEROID INJECTION INTO LUMBAR SPINE N/A 3/24/2025    Procedure: Injection-steroid-epidural-lumbar L5/S1;  Surgeon: Pedro Elizlade MD;  Location: [facility] OR;  Service: Pain Management;  Laterality: N/A;    EYE SURGERY      FUSION OF POSTERIOR COLUMN OF CERVICAL SPINE USING COMPUTER AIDED NAVIGATION N/A 5/17/2022    Procedure: FUSION, SPINE, POSTERIOR SPINAL COLUMN, CERVICAL, USING COMPUTER-ASSISTED NAVIGATION C3-4 C4-5 C5-6 C6-7 C7-T1;  Surgeon: Abram Ellison MD;  Location: [facility] OR;  Service: Neurosurgery;  Laterality: N/A;    NASAL SEPTUM SURGERY  07/05/2017    POSTERIOR CERVICAL LAMINECTOMY N/A 5/17/2022    Procedure: LAMINECTOMY, SPINE, CERVICAL, POSTERIOR APPROACH C3-T1;  Surgeon: Abram Ellison MD;  Location: [facility] OR;  Service: Neurosurgery;  Laterality: N/A;    SHOULDER ARTHROSCOPY Right 03/16/2018    RCR    TRANSFORAMINAL EPIDURAL INJECTION OF STEROID Bilateral 12/8/2021    Procedure: Injection,steroid,epidural,transforaminal approach L3/4;  Surgeon: Pedro Elizalde MD;  Location: [facility] OR;  Service: Pain Management;  Laterality: Bilateral;    TRANSFORAMINAL EPIDURAL INJECTION OF STEROID Bilateral 3/15/2022    Procedure: Injection,steroid,epidural,transforaminal approach L3/4;  Surgeon: Pedro Elizalde MD;  Location: [facility] OR;  Service: Pain Management;  Laterality: Bilateral;     "TRANSRECTAL BIOPSY OF PROSTATE WITH ULTRASOUND GUIDANCE N/A 8/28/2024    Procedure: BIOPSY, PROSTATE, RECTAL APPROACH, WITH US GUIDANCE;  Surgeon: Juaquin Mar MD;  Location: [facility];  Service: Urology;  Laterality: N/A;    VARICOCELECTOMY         Social History     Socioeconomic History    Marital status:    Tobacco Use    Smoking status: Never    Smokeless tobacco: Never   Substance and Sexual Activity    Alcohol use: Not Currently     Comment: occasionally    Drug use: No     Social Drivers of Health     Financial Resource Strain: Low Risk  (12/20/2024)    Overall Financial Resource Strain (CARDIA)     Difficulty of Paying Living Expenses: Not hard at all   Food Insecurity: No Food Insecurity (12/20/2024)    Hunger Vital Sign     Worried About Running Out of Food in the Last Year: Never true     Ran Out of Food in the Last Year: Never true   Transportation Needs: No Transportation Needs (12/20/2024)    TRANSPORTATION NEEDS     Transportation : No   Physical Activity: Sufficiently Active (11/18/2024)    Exercise Vital Sign     Days of Exercise per Week: 4 days     Minutes of Exercise per Session: 90 min   Stress: No Stress Concern Present (12/20/2024)    [employer] - Occupational Stress Questionnaire     Feeling of Stress : Not at all   Recent Concern: Stress - Stress Concern Present (11/18/2024)    [employer] - Occupational Stress Questionnaire     Feeling of Stress : Very much   Housing Stability: Unknown (12/20/2024)    Housing Stability Vital Sign     Unable to Pay for Housing in the Last Year: No     Homeless in the Last Year: No         Medications/Allergies: See med card    Vitals:    04/24/25 0945   BP: (!) 172/90   Pulse: 65   Weight: 77.6 kg (171 lb 1.2 oz)   Height: 5' 11" (1.803 m)   PainSc:   1   PainLoc: Back         Physical exam:  Gen: A and O x3, pleasant, well-groomed  Skin: No rashes or obvious lesions  HEENT: PERRLA, no " obvious deformities on ears or in canals.Trachea midline.  CVS: Regular rate and rhythm, normal palpable pulses.  Resp: Clear to auscultation bilaterally, no wheezes or rales.  Abdomen: Soft, NT/ND.  Musculoskeletal: Able to heel walk, toe walk. No antalgic gait.   Coordination   Romberg: negative  Finger to nose coordination: normal  Heel to shin coordination: somewhat off balance but able to perform  Tandem walking coordination: normal    Neuro:  Motor:    Right Left   C4 Shoulder Abduction  5  5   C5 Elbow Flexion    5  5   C6 Wrist Extension  5  5   C7 Elbow Extension   5  5   C8/T1 Hand Intrinsics   5  5   C8 First Dorsal Interosseus  5  5   C8 Abductor Pollicus Brevis  5  5       Iliopsoas Quadriceps Knee  Flexion Tibialis  anterior Gastro- cnemius EHL   Lower: R 5/5 5/5 5/5 5/5 5/5 5/5    L 5/5 5/5 5/5 5/5 5/5 5/5        Left  Right    Triceps DTR 2+ 1+   Biceps DTR 2+ 1+   Brachioradialis DTR 2+ 1+   Patellar DTR 1+ 1+   Achilles DTR 0+ 0+   Hays Absent  Absent   Clonus Absent Absent   Babinski Absent Absent     Sensory: Intact and symmetrical to light touch and pinprick in C2-T1 dermatomes bilaterally. Intact and symmetrical to light touch and pinprick in L1-S1 dermatomes bilaterally.    Lumbar spine:  Lumbar spine: ROM is full with flexion extension and oblique extension with increased low back pain during each maneuver.  Sammy's test causes no increased pain on either side.    Supine straight leg raise causes left leg pain.  Internal and external rotation of the hip causes no increased pain on either side.  Myofascial exam: No tenderness to palpation across lumbar paraspinous muscles.      Imaging:  MRI lumbar spine 05/10/2019:  This is outside imaging, I reviewed this personally.  It appears that he has an S1/2 rudimentary disc.  At L5/S1 there is slight disc height loss and slight anterolisthesis, mild bilateral foraminal narrowing, no canal narrowing, there is facet arthropathy present.  At L4/5  there is a grade 1 anterolisthesis with uncovering of the disc and facet hypertrophy causing mild to moderate canal stenosis.  There is also severe disc height loss at this level.  At L3/4 there is disc height loss and facet hypertrophy, mild canal stenosis and bilateral foraminal stenosis.    03/24/2022 MRI cervical spine paradigm report  There is reversal the normal cervical lordosis with curvature to the left.  There is disc desiccation throughout the spine with disc space narrowing and anterior osteophytic spurring from C2-3 through C7-T1.  There are endplate Modic changes from C2-3 through C6-7.  Partial interbody fusion suggested at C4-5.  C2-3 central canal 8.3 millimeters, facet hypertrophic changes, moderate to severe right and severe left foraminal stenosis.  C3-4 there is a 3.2 millimeter disc protrusion with uncovertebral spurring with she contacts and displaces the cord, central canal 3.9 millimeters, facet hypertrophic changes, severe right and moderate to severe left foraminal stenosis.  C4-5 1 millimeter of uncovertebral spurring which contacts the cord, central canal measures 7.4 millimeters, facet hypertrophic changes, moderate to severe right and moderate left foraminal stenosis.  C5-6 there is a 3.1 millimeter disc protrusion with uncovertebral spurring which effaces the cord, central canal 7.3 millimeters, facet hypertrophic changes, severe bilateral foraminal stenosis.  C6-7 there is a 3.4 millimeter disc protrusion with uncovertebral spurring which contacts the cord, central canal 7.1 millimeters, facet hypertrophic changes, severe bilateral foraminal stenosis.  C7-T1 there is a 1.1 millimeter disc bulge which effaces the dural sac, facet hypertrophic changes, severe bilateral foraminal stenosis.    09/07/2022 CT lumbar spine   There are 5 non rib-bearing lumbar vertebrae.  The S1 segment is partially lumbarized.  Considering the above nomenclature there is a approximately 6 mm anterolisthesis  of L4 on L5.  The alignment of the rest of the vertebral bodies is within normal limits.  Vertebral body heights are within normal limits.  No fractures are noted.  Multilevel degenerative disc disease throughout the lumbar spine.  Paraspinal soft tissues are unremarkable.  There is calcific atherosclerosis of the abdominal aorta and the iliac arteries.  There are multiple right renal cysts and suggestion of a peripelvic left renal cyst the right lower pole cyst measures at least 5.2 cm.  There is a calcific rim along the posterior wall of the cyst.  There is also a speck of calcification in the left renal pelvis, suspicious for a nonobstructing left renal calculus.  If further evaluation is necessary a dedicated ultrasound or CT scan of the kidneys is suggested.     L5-S1: Spondylotic changes associated with mild disc space narrowing.  Circumferential annular disc bulge right greater than left.  Bilateral mild foraminal stenosis.  There is facet arthropathy.  No significant central canal spinal stenosis.     L4-5: Approximately 6 mm anterolisthesis of L4 on L5 associated with disc degeneration and vacuum phenomena and marginal anterior and posterior osteophytes.  There is a broad-based posterior osteophyte and disc complex the with compression of the thecal sac.  Degenerative hypertrophic facet arthropathy and hypertrophy ligamentum flava results in a severe central canal spinal stenosis (image 38 series 2).  At least moderate bilateral foraminal stenosis.     L3-4: Disc degeneration with disc space narrowing and a circumferential annular disc bulge.  Hypertrophic changes of the facet joints and ligamentum flava results in a severe central canal spinal stenosis (image 30 series 2).  There is also bilateral foraminal stenosis.     L2-3: There is a 1-2 mm retrolisthesis of L2 on L3.  Disc degeneration associated with vacuum phenomena and a broad-based posterior osteophyte/disc complex.  At least moderate central  canal spinal stenosis.  There is facet arthropathy.  Bilateral foraminal stenosis.     L1-2: Spondylosis associated with marginal anterior spondylotic osteophytes.  Mild posterior bulging of the annulus.  No significant central canal spinal stenosis.     T12-L1: Spondylosis associated with marginal anterior bridging osteophytes.  No significant disc herniations or central canal spinal stenosis or significant foraminal stenotic disease.    Assessment:  The patient is an 84-year-old man with a history of glaucoma, cataracts presents in referral from Dr. Eh Love for back pain.      1. Lumbar radiculopathy, chronic  MRI Lumbar Spine Without Contrast          Plan:  1. Since he did not have sufficient relief following the L5/S1 interlaminar epidural steroid injection and it has been about 3 years since his last lumbar spine MRI, I will update this.  We will call him with results and recommendations, hopefully trying a different injection.  2. He will continue to exercise regularly.    This note was generated with the assistance of ambient listening technology. Verbal consent was obtained by the patient and accompanying visitor(s) for the recording of patient appointment to facilitate this note. I attest to having reviewed and edited the generated note for accuracy, though some syntax or spelling errors may persist. Please contact the author of this note for any clarification.

## 2025-04-24 NOTE — H&P (VIEW-ONLY)
This note was completed with dictation software and grammatical errors may exist.    CC:  Back pain    HPI:  The patient is an 84-year-old man with a history of glaucoma, cataracts presents in referral from Dr. Eh Love for back pain.    History of Present Illness    Patient reports improvement in his back after receiving an injection, but ongoing severe pain in his glutes and hamstrings. Pain is particularly intense when driving, exacerbated by car seat pressure. After just 15 minutes of driving, he experiences significant discomfort, causing him to elevate himself off the seat to alleviate pressure.    Walking is problematic, especially at a fast pace, and running is not possible. However, he can play golf without issues. Pain occurs when lifting his leg to put on socks or shoes, leading him to adopt a specific method of putting on footwear while standing. Sitting is reported as the worst position for pain.    He describes the pain as intense and initially thought he might have pulled a hamstring. He reports no weakness, though he reduces activities when feeling mild pain. Interestingly, he can perform squats without pain despite his other symptoms.    His last MRI was approximately three years ago. He continues to exercise regularly despite his symptoms.    The injection for back pain provided some relief, but minimal improvement in his glute and hamstring pain.    Prior HPI: The patient reports that he has been active for many years, was of runner even up until his 70s.  He states that 5 years ago he was running and stepped into a hole and felt sudden right hip pain, felt like his hip was shifted to the right.  He states that ever since this time, he has had back pain.  The back pain is located primarily across the bilateral low back and radiates out to the right lateral buttock and hip at times.  He used to have pain radiating along his hamstrings but no longer has this.  He denies any pain radiating into  his lower legs, no numbness or weakness.  He states that he feels stiff when he wakes up in the morning but is able to exercise and do activities throughout the day without issue.  However, his pain can come randomly such as when bending over he can feel a sudden sharp pain.  If he has pain, he can do stretches that can often relieve this.  It seems like his pain is mostly worse with extension.  He denies any bowel or bladder incontinence, no weakness.  He does not take any pain medication on a regular basis.  He exercise on a regular basis now doing stationary bike, weightlifting.    Pain intervention history:   He is status post bilateral L3/4 transforaminal epidural steroid injections on 12/08/2021 with 85% relief.    He is status post bilateral L3/4 transforaminal epidural steroid injections on 03/15/2022 with at least 50% relief.  He is status post L5/S1 interlaminar epidural steroid injection on 10/05/2022 with almost 100% relief.  He is status post L5/S1 interlaminar epidural steroid injection on 03/24/2025 with relief of his back pain but no major relief of his bilateral buttock or posterior thigh pain.    Spine surgeries:  He has seen Dr. Claude Owusu who suggested that he continue exercising, no surgery needed.    Antineuropathics: Gabapentin 100mg qhs  NSAIDs: Mobic 15  Physical therapy:  [facility] had gone [facility], also chiropractic care over the last several months.  Antidepressants:  Muscle relaxers:  Opioids:  Antiplatelets/Anticoagulants:    ROS:  He reports back pain, joint stiffness.  Balance of review of systems is negative.    Lab Results   Component Value Date    HGBA1C 5.0 12/19/2024       Lab Results   Component Value Date    WBC 12.53 12/20/2024    HGB 13.9 (L) 12/20/2024    HCT 41.3 12/20/2024    MCV 96 12/20/2024     (L) 12/20/2024             Past Medical History:   Diagnosis Date    Anemia     Arthritis     Cataract     Detached retina, bilateral     Dyslipidemia      Elevated prostate specific antigen (PSA)     General anesthetics causing adverse effect in therapeutic use     slow to arouse after shoulder surgery    GERD (gastroesophageal reflux disease)     Glaucoma     Prostate cancer     active surveillance    Restless leg syndrome        Past Surgical History:   Procedure Laterality Date    CARPAL TUNNEL RELEASE Left     CATARACT EXTRACTION Bilateral     EPIDURAL STEROID INJECTION INTO LUMBAR SPINE N/A 10/5/2022    Procedure: Injection-steroid-epidural-lumbar L5/S1;  Surgeon: Pedro Elizalde MD;  Location: [facility] OR;  Service: Pain Management;  Laterality: N/A;    EPIDURAL STEROID INJECTION INTO LUMBAR SPINE N/A 3/24/2025    Procedure: Injection-steroid-epidural-lumbar L5/S1;  Surgeon: Pedro Elizalde MD;  Location: [facility] OR;  Service: Pain Management;  Laterality: N/A;    EYE SURGERY      FUSION OF POSTERIOR COLUMN OF CERVICAL SPINE USING COMPUTER AIDED NAVIGATION N/A 5/17/2022    Procedure: FUSION, SPINE, POSTERIOR SPINAL COLUMN, CERVICAL, USING COMPUTER-ASSISTED NAVIGATION C3-4 C4-5 C5-6 C6-7 C7-T1;  Surgeon: Abram Ellison MD;  Location: [facility] OR;  Service: Neurosurgery;  Laterality: N/A;    NASAL SEPTUM SURGERY  07/05/2017    POSTERIOR CERVICAL LAMINECTOMY N/A 5/17/2022    Procedure: LAMINECTOMY, SPINE, CERVICAL, POSTERIOR APPROACH C3-T1;  Surgeon: Abram Ellison MD;  Location: [facility] OR;  Service: Neurosurgery;  Laterality: N/A;    SHOULDER ARTHROSCOPY Right 03/16/2018    RCR    TRANSFORAMINAL EPIDURAL INJECTION OF STEROID Bilateral 12/8/2021    Procedure: Injection,steroid,epidural,transforaminal approach L3/4;  Surgeon: Pedro Elizalde MD;  Location: [facility] OR;  Service: Pain Management;  Laterality: Bilateral;    TRANSFORAMINAL EPIDURAL INJECTION OF STEROID Bilateral 3/15/2022    Procedure: Injection,steroid,epidural,transforaminal approach L3/4;  Surgeon: Pedro Elizalde MD;  Location: [facility] OR;  Service: Pain Management;  Laterality: Bilateral;     "TRANSRECTAL BIOPSY OF PROSTATE WITH ULTRASOUND GUIDANCE N/A 8/28/2024    Procedure: BIOPSY, PROSTATE, RECTAL APPROACH, WITH US GUIDANCE;  Surgeon: Juaquin Mar MD;  Location: [facility];  Service: Urology;  Laterality: N/A;    VARICOCELECTOMY         Social History     Socioeconomic History    Marital status:    Tobacco Use    Smoking status: Never    Smokeless tobacco: Never   Substance and Sexual Activity    Alcohol use: Not Currently     Comment: occasionally    Drug use: No     Social Drivers of Health     Financial Resource Strain: Low Risk  (12/20/2024)    Overall Financial Resource Strain (CARDIA)     Difficulty of Paying Living Expenses: Not hard at all   Food Insecurity: No Food Insecurity (12/20/2024)    Hunger Vital Sign     Worried About Running Out of Food in the Last Year: Never true     Ran Out of Food in the Last Year: Never true   Transportation Needs: No Transportation Needs (12/20/2024)    TRANSPORTATION NEEDS     Transportation : No   Physical Activity: Sufficiently Active (11/18/2024)    Exercise Vital Sign     Days of Exercise per Week: 4 days     Minutes of Exercise per Session: 90 min   Stress: No Stress Concern Present (12/20/2024)    [employer] - Occupational Stress Questionnaire     Feeling of Stress : Not at all   Recent Concern: Stress - Stress Concern Present (11/18/2024)    [employer] - Occupational Stress Questionnaire     Feeling of Stress : Very much   Housing Stability: Unknown (12/20/2024)    Housing Stability Vital Sign     Unable to Pay for Housing in the Last Year: No     Homeless in the Last Year: No         Medications/Allergies: See med card    Vitals:    04/24/25 0945   BP: (!) 172/90   Pulse: 65   Weight: 77.6 kg (171 lb 1.2 oz)   Height: 5' 11" (1.803 m)   PainSc:   1   PainLoc: Back         Physical exam:  Gen: A and O x3, pleasant, well-groomed  Skin: No rashes or obvious lesions  HEENT: PERRLA, no " obvious deformities on ears or in canals.Trachea midline.  CVS: Regular rate and rhythm, normal palpable pulses.  Resp: Clear to auscultation bilaterally, no wheezes or rales.  Abdomen: Soft, NT/ND.  Musculoskeletal: Able to heel walk, toe walk. No antalgic gait.   Coordination   Romberg: negative  Finger to nose coordination: normal  Heel to shin coordination: somewhat off balance but able to perform  Tandem walking coordination: normal    Neuro:  Motor:    Right Left   C4 Shoulder Abduction  5  5   C5 Elbow Flexion    5  5   C6 Wrist Extension  5  5   C7 Elbow Extension   5  5   C8/T1 Hand Intrinsics   5  5   C8 First Dorsal Interosseus  5  5   C8 Abductor Pollicus Brevis  5  5       Iliopsoas Quadriceps Knee  Flexion Tibialis  anterior Gastro- cnemius EHL   Lower: R 5/5 5/5 5/5 5/5 5/5 5/5    L 5/5 5/5 5/5 5/5 5/5 5/5        Left  Right    Triceps DTR 2+ 1+   Biceps DTR 2+ 1+   Brachioradialis DTR 2+ 1+   Patellar DTR 1+ 1+   Achilles DTR 0+ 0+   Hays Absent  Absent   Clonus Absent Absent   Babinski Absent Absent     Sensory: Intact and symmetrical to light touch and pinprick in C2-T1 dermatomes bilaterally. Intact and symmetrical to light touch and pinprick in L1-S1 dermatomes bilaterally.    Lumbar spine:  Lumbar spine: ROM is full with flexion extension and oblique extension with increased low back pain during each maneuver.  Sammy's test causes no increased pain on either side.    Supine straight leg raise causes left leg pain.  Internal and external rotation of the hip causes no increased pain on either side.  Myofascial exam: No tenderness to palpation across lumbar paraspinous muscles.      Imaging:  MRI lumbar spine 05/10/2019:  This is outside imaging, I reviewed this personally.  It appears that he has an S1/2 rudimentary disc.  At L5/S1 there is slight disc height loss and slight anterolisthesis, mild bilateral foraminal narrowing, no canal narrowing, there is facet arthropathy present.  At L4/5  there is a grade 1 anterolisthesis with uncovering of the disc and facet hypertrophy causing mild to moderate canal stenosis.  There is also severe disc height loss at this level.  At L3/4 there is disc height loss and facet hypertrophy, mild canal stenosis and bilateral foraminal stenosis.    03/24/2022 MRI cervical spine paradigm report  There is reversal the normal cervical lordosis with curvature to the left.  There is disc desiccation throughout the spine with disc space narrowing and anterior osteophytic spurring from C2-3 through C7-T1.  There are endplate Modic changes from C2-3 through C6-7.  Partial interbody fusion suggested at C4-5.  C2-3 central canal 8.3 millimeters, facet hypertrophic changes, moderate to severe right and severe left foraminal stenosis.  C3-4 there is a 3.2 millimeter disc protrusion with uncovertebral spurring with she contacts and displaces the cord, central canal 3.9 millimeters, facet hypertrophic changes, severe right and moderate to severe left foraminal stenosis.  C4-5 1 millimeter of uncovertebral spurring which contacts the cord, central canal measures 7.4 millimeters, facet hypertrophic changes, moderate to severe right and moderate left foraminal stenosis.  C5-6 there is a 3.1 millimeter disc protrusion with uncovertebral spurring which effaces the cord, central canal 7.3 millimeters, facet hypertrophic changes, severe bilateral foraminal stenosis.  C6-7 there is a 3.4 millimeter disc protrusion with uncovertebral spurring which contacts the cord, central canal 7.1 millimeters, facet hypertrophic changes, severe bilateral foraminal stenosis.  C7-T1 there is a 1.1 millimeter disc bulge which effaces the dural sac, facet hypertrophic changes, severe bilateral foraminal stenosis.    09/07/2022 CT lumbar spine   There are 5 non rib-bearing lumbar vertebrae.  The S1 segment is partially lumbarized.  Considering the above nomenclature there is a approximately 6 mm anterolisthesis  of L4 on L5.  The alignment of the rest of the vertebral bodies is within normal limits.  Vertebral body heights are within normal limits.  No fractures are noted.  Multilevel degenerative disc disease throughout the lumbar spine.  Paraspinal soft tissues are unremarkable.  There is calcific atherosclerosis of the abdominal aorta and the iliac arteries.  There are multiple right renal cysts and suggestion of a peripelvic left renal cyst the right lower pole cyst measures at least 5.2 cm.  There is a calcific rim along the posterior wall of the cyst.  There is also a speck of calcification in the left renal pelvis, suspicious for a nonobstructing left renal calculus.  If further evaluation is necessary a dedicated ultrasound or CT scan of the kidneys is suggested.     L5-S1: Spondylotic changes associated with mild disc space narrowing.  Circumferential annular disc bulge right greater than left.  Bilateral mild foraminal stenosis.  There is facet arthropathy.  No significant central canal spinal stenosis.     L4-5: Approximately 6 mm anterolisthesis of L4 on L5 associated with disc degeneration and vacuum phenomena and marginal anterior and posterior osteophytes.  There is a broad-based posterior osteophyte and disc complex the with compression of the thecal sac.  Degenerative hypertrophic facet arthropathy and hypertrophy ligamentum flava results in a severe central canal spinal stenosis (image 38 series 2).  At least moderate bilateral foraminal stenosis.     L3-4: Disc degeneration with disc space narrowing and a circumferential annular disc bulge.  Hypertrophic changes of the facet joints and ligamentum flava results in a severe central canal spinal stenosis (image 30 series 2).  There is also bilateral foraminal stenosis.     L2-3: There is a 1-2 mm retrolisthesis of L2 on L3.  Disc degeneration associated with vacuum phenomena and a broad-based posterior osteophyte/disc complex.  At least moderate central  canal spinal stenosis.  There is facet arthropathy.  Bilateral foraminal stenosis.     L1-2: Spondylosis associated with marginal anterior spondylotic osteophytes.  Mild posterior bulging of the annulus.  No significant central canal spinal stenosis.     T12-L1: Spondylosis associated with marginal anterior bridging osteophytes.  No significant disc herniations or central canal spinal stenosis or significant foraminal stenotic disease.    Assessment:  The patient is an 84-year-old man with a history of glaucoma, cataracts presents in referral from Dr. Eh Love for back pain.      1. Lumbar radiculopathy, chronic  MRI Lumbar Spine Without Contrast          Plan:  1. Since he did not have sufficient relief following the L5/S1 interlaminar epidural steroid injection and it has been about 3 years since his last lumbar spine MRI, I will update this.  We will call him with results and recommendations, hopefully trying a different injection.  2. He will continue to exercise regularly.    This note was generated with the assistance of ambient listening technology. Verbal consent was obtained by the patient and accompanying visitor(s) for the recording of patient appointment to facilitate this note. I attest to having reviewed and edited the generated note for accuracy, though some syntax or spelling errors may persist. Please contact the author of this note for any clarification.

## 2025-04-25 ENCOUNTER — HOSPITAL ENCOUNTER (OUTPATIENT)
Dept: RADIOLOGY | Facility: HOSPITAL | Age: 85
Discharge: HOME | End: 2025-04-25
Attending: PHYSICIAN ASSISTANT
Payer: MEDICARE

## 2025-04-25 DIAGNOSIS — M54.16 LUMBAR RADICULOPATHY, CHRONIC: ICD-10-CM

## 2025-04-25 PROCEDURE — 72148 MRI LUMBAR SPINE W/O DYE: CPT | Mod: TC,PO

## 2025-04-25 PROCEDURE — 72148 MRI LUMBAR SPINE W/O DYE: CPT | Mod: 26,,, | Performed by: RADIOLOGY

## 2025-04-29 ENCOUNTER — TELEPHONE (OUTPATIENT)
Dept: PAIN MEDICINE | Facility: CLINIC | Age: 85
End: 2025-04-29
Payer: MEDICARE

## 2025-04-29 NOTE — TELEPHONE ENCOUNTER
Please let the patient know I reviewed his new lumbar spine MRI and he has multiple levels of canal stenosis with the worst level being at L3/4.  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

## 2025-05-01 DIAGNOSIS — M54.16 LUMBAR RADICULOPATHY: Primary | ICD-10-CM

## 2025-05-01 RX ORDER — ALPRAZOLAM 1 MG/1
1 TABLET, ORALLY DISINTEGRATING ORAL ONCE AS NEEDED
OUTPATIENT
Start: 2025-05-01 | End: 2036-09-27

## 2025-05-12 ENCOUNTER — HOSPITAL ENCOUNTER (OUTPATIENT)
Facility: HOSPITAL | Age: 85
Discharge: HOME | End: 2025-05-12
Attending: ANESTHESIOLOGY | Admitting: ANESTHESIOLOGY
Payer: MEDICARE

## 2025-05-12 ENCOUNTER — HOSPITAL ENCOUNTER (OUTPATIENT)
Dept: RADIOLOGY | Facility: HOSPITAL | Age: 85
Discharge: HOME | End: 2025-05-12
Attending: ANESTHESIOLOGY
Payer: MEDICARE

## 2025-05-12 VITALS
RESPIRATION RATE: 16 BRPM | HEART RATE: 61 BPM | SYSTOLIC BLOOD PRESSURE: 180 MMHG | WEIGHT: 171 LBS | TEMPERATURE: 97 F | BODY MASS INDEX: 23.94 KG/M2 | HEIGHT: 71 IN | DIASTOLIC BLOOD PRESSURE: 87 MMHG | OXYGEN SATURATION: 100 %

## 2025-05-12 DIAGNOSIS — M54.16 LUMBAR RADICULOPATHY: ICD-10-CM

## 2025-05-12 DIAGNOSIS — M54.50 LOWER BACK PAIN: ICD-10-CM

## 2025-05-12 PROCEDURE — 25500020 PHARM REV CODE 255: Mod: PO | Performed by: ANESTHESIOLOGY

## 2025-05-12 PROCEDURE — 63600175 PHARM REV CODE 636 W HCPCS: Mod: JZ,TB,PO | Performed by: ANESTHESIOLOGY

## 2025-05-12 PROCEDURE — 64483 NJX AA&/STRD TFRM EPI L/S 1: CPT | Mod: 50,PO | Performed by: ANESTHESIOLOGY

## 2025-05-12 PROCEDURE — 64483 NJX AA&/STRD TFRM EPI L/S 1: CPT | Mod: 50,,, | Performed by: ANESTHESIOLOGY

## 2025-05-12 RX ORDER — LIDOCAINE HYDROCHLORIDE 10 MG/ML
INJECTION, SOLUTION EPIDURAL; INFILTRATION; INTRACAUDAL; PERINEURAL
Status: DISCONTINUED | OUTPATIENT
Start: 2025-05-12 | End: 2025-05-12 | Stop reason: HOSPADM

## 2025-05-12 RX ORDER — ALPRAZOLAM 0.5 MG/1
1 TABLET, ORALLY DISINTEGRATING ORAL ONCE AS NEEDED
Status: DISCONTINUED | OUTPATIENT
Start: 2025-05-12 | End: 2025-05-12 | Stop reason: HOSPADM

## 2025-05-12 RX ORDER — BUPIVACAINE HYDROCHLORIDE 2.5 MG/ML
INJECTION, SOLUTION EPIDURAL; INFILTRATION; INTRACAUDAL; PERINEURAL
Status: DISCONTINUED | OUTPATIENT
Start: 2025-05-12 | End: 2025-05-12 | Stop reason: HOSPADM

## 2025-05-12 RX ORDER — METHYLPREDNISOLONE ACETATE 80 MG/ML
INJECTION, SUSPENSION INTRA-ARTICULAR; INTRALESIONAL; INTRAMUSCULAR; SOFT TISSUE
Status: DISCONTINUED | OUTPATIENT
Start: 2025-05-12 | End: 2025-05-12 | Stop reason: HOSPADM

## 2025-05-12 NOTE — OP NOTE
PROCEDURE DATE: 5/12/2025    PROCEDURE: Bilateral L3/4 transforaminal epidural steroid injection under fluoroscopy    DIAGNOSIS: Lumbar  Radiculopathy    Post op diagnosis: Same    PHYSICIAN: Pedro Elizalde MD    MEDICATIONS INJECTED:  Methylprednisolone 40mg (1ml) and 1ml 0.25% bupivicaine at each nerve root.     LOCAL ANESTHETIC INJECTED:  Lidocaine 1%. 4 ml per site.    SEDATION MEDICATIONS: none    ESTIMATED BLOOD LOSS:  none    COMPLICATIONS:  none    TECHNIQUE:   A time-out was taken to identify patient and procedure side prior to starting the procedure. The patient was placed in a prone position, prepped and draped in the usual sterile fashion using ChloraPrep and sterile towels.  The area to be injected was determined under fluoroscopic guidance in AP and oblique view.  Local anesthetic was given by raising a wheal and going down to the hub of a 25-gauge 1.5 inch needle.  In oblique view, a 3.5 inch 22-gauge bent-tip spinal needle was introduced towards 6 oclock position of the pedicle of each above named nerve root level.  The needle was walked medially then hinged into the neural foramen and position was confirmed in AP and lateral views.  Omnipaque contrast dye was injected to confirm appropriate placement and that there was no vascular uptake.  After negative aspiration for blood or CSF, the medication was then injected. This was performed at the right and then left L3/4 level(s). The patient tolerated the procedure well.    The patient was monitored after the procedure.  Patient was given post procedure and discharge instructions to follow at home. The patient was discharged in a stable condition.

## 2025-05-12 NOTE — DISCHARGE INSTRUCTIONS

## 2025-05-12 NOTE — PLAN OF CARE
Pt meets criteria for discharge. Vital signs stable. Pt without falls. Discharge teaching complete. Questions answered.     
VSS, all questions answered. Denies recent fever or illness. Pt states ready for procedure.   
155

## 2025-05-12 NOTE — DISCHARGE SUMMARY
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    DISPOSITION: Home or Self Care    FINAL DIAGNOSIS:  Lumbar radiculopathy    FOLLOWUP: In clinic    DISCHARGE INSTRUCTIONS:    Discharge Procedure Orders   Diet Adult Regular     No dressing needed     Notify your health care provider if you experience any of the following:  temperature >100.4     Activity as tolerated

## 2025-06-09 ENCOUNTER — OFFICE VISIT (OUTPATIENT)
Dept: PAIN MEDICINE | Facility: CLINIC | Age: 85
End: 2025-06-09
Payer: MEDICARE

## 2025-06-09 VITALS
HEART RATE: 59 BPM | SYSTOLIC BLOOD PRESSURE: 131 MMHG | WEIGHT: 174.19 LBS | BODY MASS INDEX: 24.39 KG/M2 | DIASTOLIC BLOOD PRESSURE: 82 MMHG | HEIGHT: 71 IN

## 2025-06-09 DIAGNOSIS — M47.816 LUMBAR SPONDYLOSIS: ICD-10-CM

## 2025-06-09 DIAGNOSIS — M54.16 LUMBAR RADICULOPATHY: Primary | ICD-10-CM

## 2025-06-09 PROCEDURE — 99999 PR PBB SHADOW E&M-EST. PATIENT-LVL III: CPT | Mod: PBBFAC,,, | Performed by: ANESTHESIOLOGY

## 2025-06-09 PROCEDURE — 99213 OFFICE O/P EST LOW 20 MIN: CPT | Mod: PBBFAC,PO | Performed by: ANESTHESIOLOGY

## 2025-06-09 PROCEDURE — 99213 OFFICE O/P EST LOW 20 MIN: CPT | Mod: S$PBB,,, | Performed by: ANESTHESIOLOGY

## 2025-06-09 NOTE — PROGRESS NOTES
This note was completed with dictation software and grammatical errors may exist.    Chief Complaint   Patient presents with    Low-back Pain         HPI:  The patient is an 84-year-old man with a history of glaucoma, cataracts presents in referral from Dr. Eh Love for back pain.  He is status post bilateral L3/4 transforaminal injection on 05/12/2025 with greater than 50% relief.  History of Present Illness    Mr. Terrazas presents for follow-up of back pain and evaluation of a recent MRI. He reports improvement in hamstring pain following a recent L3-4 TFESI, stating his hamstrings are significantly better. However, significant discomfort persists in the glutes and back. He describes a band-like sensation around the back, comparing it to a weight lifting belt, which is particularly painful in the mornings. Pain is worst when getting out of bed but improves after showering and eating breakfast.    Back pain worsens with prolonged sitting, especially when driving for more than an hour, and finds relief by standing or moving around. He can play 18 holes of golf without significant discomfort, though soreness may occur the next day.    He also reports left testicular pain, described as a sensation of his shoulders pulling the testicle back up into the body. This pain is exacerbated by certain movements, particularly when bending and straightening up.    He has a history of being very physically active, participating in various sports including gymnastics, football, track, and basketball. When engaging in regular stretching and exercise, the back pain almost completely resolves. However, recent activity has decreased due to caring for his spouse following shoulder replacement.    Mr. Terrazas recently underwent an L3-4 injection, which provided improvement in his hamstring pain. He is also engaged in ongoing stretching and exercise routines, which have been helpful in reducing his back  pain.      ROS:  Musculoskeletal: +back pain, +pain with movement, +muscle stiffness, +joint stiffness  Male Genitourinary: +testicular pain                   Prior HPI: The patient reports that he has been active for many years, was of runner even up until his 70s.  He states that 5 years ago he was running and stepped into a hole and felt sudden right hip pain, felt like his hip was shifted to the right.  He states that ever since this time, he has had back pain.  The back pain is located primarily across the bilateral low back and radiates out to the right lateral buttock and hip at times.  He used to have pain radiating along his hamstrings but no longer has this.  He denies any pain radiating into his lower legs, no numbness or weakness.  He states that he feels stiff when he wakes up in the morning but is able to exercise and do activities throughout the day without issue.  However, his pain can come randomly such as when bending over he can feel a sudden sharp pain.  If he has pain, he can do stretches that can often relieve this.  It seems like his pain is mostly worse with extension.  He denies any bowel or bladder incontinence, no weakness.  He does not take any pain medication on a regular basis.  He exercise on a regular basis now doing stationary bike, weightlifting.    Pain intervention history:   He is status post bilateral L3/4 transforaminal epidural steroid injections on 12/08/2021 with 85% relief.    He is status post bilateral L3/4 transforaminal epidural steroid injections on 03/15/2022 with at least 50% relief.  He is status post L5/S1 interlaminar epidural steroid injection on 10/05/2022 with almost 100% relief.  He is status post L5/S1 interlaminar epidural steroid injection on 03/24/2025 with relief of his back pain but no major relief of his bilateral buttock or posterior thigh pain. He is status post bilateral L3/4 transforaminal injection on 05/12/2025 with greater than 50%  relief.    Spine surgeries:  He has seen Dr. Claude Owusu who suggested that he continue exercising, no surgery needed.    Antineuropathics: Gabapentin 100mg qhs  NSAIDs: Mobic 15  Physical therapy:  [facility], also chiropractic care over the last several months.  Antidepressants:  Muscle relaxers:  Opioids:  Antiplatelets/Anticoagulants:        Lab Results   Component Value Date    HGBA1C 5.0 12/19/2024       Lab Results   Component Value Date    WBC 6.87 04/24/2025    HGB 16.0 04/24/2025    HCT 47.2 04/24/2025    MCV 97 04/24/2025     04/24/2025             Past Medical History:   Diagnosis Date    Anemia     Arthritis     Cataract     Detached retina, bilateral     Dyslipidemia     Elevated prostate specific antigen (PSA)     General anesthetics causing adverse effect in therapeutic use     slow to arouse after shoulder surgery    GERD (gastroesophageal reflux disease)     Glaucoma     Prostate cancer     active surveillance    Restless leg syndrome        Past Surgical History:   Procedure Laterality Date    CARPAL TUNNEL RELEASE Left     CATARACT EXTRACTION Bilateral     EPIDURAL STEROID INJECTION INTO LUMBAR SPINE N/A 10/5/2022    Procedure: Injection-steroid-epidural-lumbar L5/S1;  Surgeon: Pedro Elizalde MD;  Location: [facility] OR;  Service: Pain Management;  Laterality: N/A;    EPIDURAL STEROID INJECTION INTO LUMBAR SPINE N/A 3/24/2025    Procedure: Injection-steroid-epidural-lumbar L5/S1;  Surgeon: Pedro Elizalde MD;  Location: [facility] OR;  Service: Pain Management;  Laterality: N/A;    EYE SURGERY      FUSION OF POSTERIOR COLUMN OF CERVICAL SPINE USING COMPUTER AIDED NAVIGATION N/A 5/17/2022    Procedure: FUSION, SPINE, POSTERIOR SPINAL COLUMN, CERVICAL, USING COMPUTER-ASSISTED NAVIGATION C3-4 C4-5 C5-6 C6-7 C7-T1;  Surgeon: Abram Ellison MD;  Location: [facility] OR;  Service: Neurosurgery;  Laterality: N/A;    INJECTION, SPINE, LUMBOSACRAL, TRANSFORAMINAL APPROACH  Bilateral 5/12/2025    Procedure: INJECTION, SPINE, LUMBOSACRAL, TRANSFORAMINAL APPROACH L3/4  (he has lumbosacral transitional anatomy with partial lumbarization of S1);  Surgeon: Pedro Elizalde MD;  Location: [facility];  Service: Pain Management;  Laterality: Bilateral;    NASAL SEPTUM SURGERY  07/05/2017    POSTERIOR CERVICAL LAMINECTOMY N/A 5/17/2022    Procedure: LAMINECTOMY, SPINE, CERVICAL, POSTERIOR APPROACH C3-T1;  Surgeon: Abram Ellison MD;  [facility];  Service: Neurosurgery;  Laterality: N/A;    SHOULDER ARTHROSCOPY Right 03/16/2018    RCR    TRANSFORAMINAL EPIDURAL INJECTION OF STEROID Bilateral 12/8/2021    Procedure: Injection,steroid,epidural,transforaminal approach L3/4;  Surgeon: Pedro Elizalde MD;  Location: [facility];  Service: Pain Management;  Laterality: Bilateral;    TRANSFORAMINAL EPIDURAL INJECTION OF STEROID Bilateral 3/15/2022    Procedure: Injection,steroid,epidural,transforaminal approach L3/4;  Surgeon: Pedro Elizalde MD;  Location: [facility];  Service: Pain Management;  Laterality: Bilateral;    TRANSRECTAL BIOPSY OF PROSTATE WITH ULTRASOUND GUIDANCE N/A 8/28/2024    Procedure: BIOPSY, PROSTATE, RECTAL APPROACH, WITH US GUIDANCE;  Surgeon: Juaquin Mar MD;  Location: [facility];  Service: Urology;  Laterality: N/A;    VARICOCELECTOMY         Social History     Socioeconomic History    Marital status:    Tobacco Use    Smoking status: Never    Smokeless tobacco: Never   Substance and Sexual Activity    Alcohol use: Not Currently     Comment: occasionally    Drug use: No     Social Drivers of Health     Financial Resource Strain: Low Risk  (5/17/2025)    Overall Financial Resource Strain (CARDIA)     Difficulty of Paying Living Expenses: Not hard at all   Food Insecurity: No Food Insecurity (5/17/2025)    Hunger Vital Sign     Worried About Running Out of Food in the Last Year: Never true     Ran Out of Food in the Last Year: Never true   Transportation  "Needs: No Transportation Needs (5/17/2025)    PRAPARE - Transportation     Lack of Transportation (Medical): No     Lack of Transportation (Non-Medical): No   Physical Activity: Sufficiently Active (5/17/2025)    Exercise Vital Sign     Days of Exercise per Week: 5 days     Minutes of Exercise per Session: 40 min   Stress: Patient Declined (5/17/2025)    [facility] - Occupational Stress Questionnaire     Feeling of Stress : Patient declined   Housing Stability: Low Risk  (5/17/2025)    Housing Stability Vital Sign     Unable to Pay for Housing in the Last Year: No     Number of Times Moved in the Last Year: 0     Homeless in the Last Year: No         Medications/Allergies: See med card    Vitals:    06/09/25 1101   BP: 131/82   Pulse: (!) 59   Weight: 79 kg (174 lb 2.6 oz)   Height: 5' 11" (1.803 m)   PainSc:   5   PainLoc: Back         Physical exam:  Gen: A and O x3, pleasant, well-groomed  Musculoskeletal: Able to heel walk, toe walk. No antalgic gait.   Coordination   Romberg: negative  Finger to nose coordination: normal  Heel to shin coordination: somewhat off balance but able to perform  Tandem walking coordination: normal    Neuro:  Motor:    Right Left   C4 Shoulder Abduction  5  5   C5 Elbow Flexion    5  5   C6 Wrist Extension  5  5   C7 Elbow Extension   5  5   C8/T1 Hand Intrinsics   5  5   C8 First Dorsal Interosseus  5  5   C8 Abductor Pollicus Brevis  5  5       Iliopsoas Quadriceps Knee  Flexion Tibialis  anterior Gastro- cnemius EHL   Lower: R 5/5 5/5 5/5 5/5 5/5 5/5    L 5/5 5/5 5/5 5/5 5/5 5/5        Left  Right    Triceps DTR 2+ 1+   Biceps DTR 2+ 1+   Brachioradialis DTR 2+ 1+   Patellar DTR 1+ 1+   Achilles DTR 0+ 0+   Hays Absent  Absent   Clonus Absent Absent   Babinski Absent Absent     Sensory: Intact and symmetrical to light touch and pinprick in C2-T1 dermatomes bilaterally. Intact and symmetrical to light touch and pinprick in L1-S1 dermatomes " bilaterally.    Lumbar spine:  Lumbar spine: ROM is full with flexion extension and oblique extension with increased low back pain during each maneuver.  Sammy's test causes no increased pain on either side.    Supine straight leg raise causes left leg pain.  Internal and external rotation of the hip causes no increased pain on either side.  Myofascial exam: No tenderness to palpation across lumbar paraspinous muscles.      [id number] MRI cervical spine paradigm report  There is reversal the normal cervical lordosis with curvature to the left.  There is disc desiccation throughout the spine with disc space narrowing and anterior osteophytic spurring from C2-3 through C7-T1.  There are endplate Modic changes from C2-3 through C6-7.  Partial interbody fusion suggested at C4-5.  C2-3 central canal 8.3 millimeters, facet hypertrophic changes, moderate to severe right and severe left foraminal stenosis.  C3-4 there is a 3.2 millimeter disc protrusion with uncovertebral spurring with she contacts and displaces the cord, central canal 3.9 millimeters, facet hypertrophic changes, severe right and moderate to severe left foraminal stenosis.  C4-5 1 millimeter of uncovertebral spurring which contacts the cord, central canal measures 7.4 millimeters, facet hypertrophic changes, moderate to severe right and moderate left foraminal stenosis.  C5-6 there is a 3.1 millimeter disc protrusion with uncovertebral spurring which effaces the cord, central canal 7.3 millimeters, facet hypertrophic changes, severe bilateral foraminal stenosis.  C6-7 there is a 3.4 millimeter disc protrusion with uncovertebral spurring which contacts the cord, central canal 7.1 millimeters, facet hypertrophic changes, severe bilateral foraminal stenosis.  C7-T1 there is a 1.1 millimeter disc bulge which effaces the dural sac, facet hypertrophic changes, severe bilateral foraminal stenosis.    25 MRI L-spine:  Morphology: Vertebral body  heights are preserved.  No fractures or aggressive lesions.  Prominent Modic type 1 endplate centered marrow changes at L2-L3 broadly and to a lesser extent posteriorly at L3-L4 with associated prominent Schmorl's nodes and endplate centered osteophytes.  Scattered fatty marrow changes most notably within the visualized sacrum/pelvis.  There is lumbosacral transitional anatomy with partial lumbarization of S1..   Alignment: Grade 1 retrolisthesis of L2 on L3 and anterolisthesis of L3 on L4, L4 on L5, and L5 on S1.   Cord: Normal in contour, caliber, and signal intensity.  Conus positioning is within normal limits.   Disc levels:   T12-L1: Mild degenerative disc changes but no significant spinal canal or foraminal narrowing.  Mild bilateral facet arthrosis.   L1-L2: Mild degenerative disc changes but no significant spinal canal or foraminal narrowing.  Mild bilateral facet arthrosis.   L2-L3: Severe degenerative disc height loss with disc bulging and osteophytic endplate spurring as well as moderate bilateral facet arthrosis with ligamentum flavum thickening producing severe narrowing of the spinal canal and severe left/moderate right foraminal narrowing.   L3-L4: Severe degenerative disc height loss with prominent disc bulging and osteophytic endplate spurring as well as moderate facet arthrosis and ligamentum flavum thickening producing severe narrowing of the spinal canal and severe right/moderate to severe left foraminal narrowing.   L4-L5: Severe degenerative disc height loss with disc bulging and severe bilateral facet arthrosis with ligamentum flavum thickening and spondylolisthesis producing severe narrowing of the spinal canal and moderate to severe bilateral foraminal narrowing.   L5-S1: Mild-to-moderate disc height loss with disc bulging and severe bilateral facet arthrosis producing mild narrowing of the spinal canal and moderate left/mild right foraminal narrowing.   S1-S2: Rudimentary disc level.  No  significant spinal canal or foraminal narrowing.  Mild to moderate bilateral facet arthrosis.    Assessment:  The patient is an 84-year-old man with a history of glaucoma, cataracts presents in referral from Dr. Eh Love for back pain.      1. Lumbar radiculopathy        2. Lumbar spondylosis              Plan:  Assessment & Plan    LOW BACK PAIN:  1.  At this point his pain is fairly well controlled.  We discussed that the back pain continues to bother him enough, we would consider medial branch blocks, possibly basivertebral nerve ablation since some of his symptoms of severe pain with sitting would point to more anterior column pain.  Continue with stretching and exercise routine. Resume gym activities, including weight lifting.    FOLLOW-UP:  1. Follow up in 6 months or sooner if symptoms worsen.     This note was generated with the assistance of ambient listening technology. Verbal consent was obtained by the patient and accompanying visitor(s) for the recording of patient appointment to facilitate this note. I attest to having reviewed and edited the generated note for accuracy, though some syntax or spelling errors may persist. Please contact the author of this note for any clarification.

## (undated) DEVICE — TRAY NERVE BLOCK

## (undated) DEVICE — GLOVE SURGICAL LATEX SZ 7

## (undated) DEVICE — MARKER SKIN STND TIP BLUE BARR

## (undated) DEVICE — APPLICATOR CHLORAPREP CLR 10.5

## (undated) DEVICE — NDL TUOHY EPIDURAL 20G X 3.5

## (undated) DEVICE — TOWEL OR DISP STRL BLUE 4/PK

## (undated) DEVICE — GLOVE SENSICARE PI MICRO 7

## (undated) DEVICE — MARKER SKIN RULER STERILE

## (undated) DEVICE — NDL SPINAL SPINOCAN 22GX3.5

## (undated) DEVICE — SYR GLASS 5CC LUER LOK